# Patient Record
Sex: FEMALE | Race: WHITE | Employment: OTHER | URBAN - METROPOLITAN AREA
[De-identification: names, ages, dates, MRNs, and addresses within clinical notes are randomized per-mention and may not be internally consistent; named-entity substitution may affect disease eponyms.]

---

## 2017-01-11 ENCOUNTER — OFFICE VISIT (OUTPATIENT)
Dept: OPHTHALMOLOGY | Facility: CLINIC | Age: 82
End: 2017-01-11
Payer: COMMERCIAL

## 2017-01-11 DIAGNOSIS — H40.1492 PSEUDOEXFOLIATION GLAUCOMA, MODERATE STAGE: Primary | ICD-10-CM

## 2017-01-11 PROCEDURE — 99213 OFFICE O/P EST LOW 20 MIN: CPT | Performed by: OPHTHALMOLOGY

## 2017-01-11 ASSESSMENT — TONOMETRY
OD_IOP_MMHG: 13
OS_IOP_MMHG: 13
IOP_METHOD: APPLANATION

## 2017-01-11 ASSESSMENT — VISUAL ACUITY
METHOD: SNELLEN - LINEAR
OS_SC: 20/30
OD_SC: 20/30

## 2017-01-11 ASSESSMENT — SLIT LAMP EXAM - LIDS: COMMENTS: 1+ DERMATOCHALASIS - UPPER LID

## 2017-01-11 ASSESSMENT — EXTERNAL EXAM - LEFT EYE: OS_EXAM: NORMAL

## 2017-01-11 ASSESSMENT — EXTERNAL EXAM - RIGHT EYE: OD_EXAM: NORMAL

## 2017-01-11 NOTE — PROGRESS NOTES
Current Eye Medications:  Xalatan every evening both eyes 9:30 pm, Trusopt Bid both eyes 10 am     Subjective:  3 wk intraocular pressure recheck with post slt left eye times 3 wks.  Patient reports she continues to see good and her eyes feel otherwise fine to her.     Objective:  See Ophthalmology Exam.       Assessment:  Intraocular pressure still +/- following recent Selective Laser Trabeculoplasty left eye.      Plan: Continue Xalatan every evening both eyes and Trusopt twice daily both eyes.  Return visit 4 months for a complete exam     Clay Amos M.D.

## 2017-01-11 NOTE — PATIENT INSTRUCTIONS
Continue Xalatan every evening both eyes and Trusopt twice daily both eyes.  Return visit 4 months for a complete exam     Clay Amos M.D.

## 2017-01-11 NOTE — MR AVS SNAPSHOT
"              After Visit Summary   2017    Jeb Palomino    MRN: [id number]           Patient Information     Date Of Birth          1935        Visit Information        Provider Department      2017 2:15 PM Clay Amos MD [facility]        Today's Diagnoses     Pseudoexfoliation glaucoma, moderate stage    -  1       Care Instructions    Continue Xalatan every evening both eyes and Trusopt twice daily both eyes.  Return visit 4 months for a complete exam     Clay Amos M.D.          Follow-ups after your visit        Who to contact     If you have questions or need follow up information about today's clinic visit or your schedule please contact [facility] directly at [phone].  Normal or non-critical lab and imaging results will be communicated to you by MiNOWirelesshart, letter or phone within 4 business days after the clinic has received the results. If you do not hear from us within 7 days, please contact the clinic through MiNOWirelesshart or phone. If you have a critical or abnormal lab result, we will notify you by phone as soon as possible.  Submit refill requests through wireLawyer or call your pharmacy and they will forward the refill request to us. Please allow 3 business days for your refill to be completed.          Additional Information About Your Visit        MyChart Information     wireLawyer lets you send messages to your doctor, view your test results, renew your prescriptions, schedule appointments and more. To sign up, go to [email or web address] . Click on \"Log in\" on the left side of the screen, which will take you to the Welcome page. Then click on \"Sign up Now\" on the right side of the page.     You will be asked to enter the access code listed below, as well as some personal information. Please follow the directions to create your username and password.     Your access code is: [email or web address]  Expires: 3/20/2017  2:30 PM     Your access code will  " in 90 days. If you need help or a new code, please call your [facility] clinic or [phone].        Care EveryWhere ID     This is your Care EveryWhere ID. This could be used by other organizations to access your [facility] medical records  [id number]         Blood Pressure from Last 3 Encounters:   07/18/16 81/57   05/23/16 133/61   06/22/15 125/63    Weight from Last 3 Encounters:   07/18/16 53.978 kg (119 lb)   06/22/15 62.143 kg (137 lb)   04/27/15 65.137 kg (143 lb 9.6 oz)              Today, you had the following     No orders found for display       Primary Care Provider Office Phone # Fax #    Jose Doyle [phone]       [address] [facility] [address]        Thank you!     Thank you for choosing [facility]  for your care. Our goal is always to provide you with excellent care. Hearing back from our patients is one way we can continue to improve our services. Please take a few minutes to complete the written survey that you may receive in the mail after your visit with us. Thank you!             Your Updated Medication List - Protect others around you: Learn how to safely use, store and throw away your medicines at www.disposemymeds.org.          This list is accurate as of: 1/11/17  3:34 PM.  Always use your most recent med list.                   Brand Name Dispense Instructions for use    * albuterol (2.5 MG/3ML) 0.083% neb solution     1 Box    Take 1 vial (2.5 mg) by nebulization every 4 hours as needed for shortness of breath / dyspnea       * albuterol 108 (90 BASE) MCG/ACT Inhaler    PROAIR HFA/PROVENTIL HFA/VENTOLIN HFA    1 Inhaler    Inhale 2 puffs into the lungs every 6 hours as needed for shortness of breath / dyspnea       aspirin 81 MG EC tablet     40 tablet    Take 1 tablet (81 mg) by mouth daily       atenolol 25 MG tablet    TENORMIN    90 tablet    Take 2 tablets (50 mg) by mouth daily        budesonide-formoterol 160-4.5 MCG/ACT Inhaler    SYMBICORT    3 Inhaler    Inhale 2 puffs into the lungs 2 times daily       dorzolamide 2 % ophthalmic solution    TRUSOPT    3 Bottle    Place 1 drop into both eyes 2 times daily       latanoprost 0.005 % ophthalmic solution    XALATAN    7.5 mL    Place 1 drop into both eyes At Bedtime       MULTI VIT/FL PO      1 TABLET DAILY       order for DME     1 each    Equipment being ordered: Oxygen.  Oxygen to be titrated per RT to keep SaO2 > 90%. Enroll patient in COPD program for continued follow up. 12/31/12 SaO2 90% room air rest, 76% room air with activity, 88% 2lpm continuous flow with activity.       order for DME     1 Device    Equipment being ordered: Vision Air machine and all associated supplies  Patient is on home oxygen       simvastatin 20 MG tablet    ZOCOR    90 tablet    Take 1 tablet (20 mg) by mouth At Bedtime Overdue for office visit and labs       tiotropium 18 MCG capsule    SPIRIVA HANDIHALER    90 capsule    Inhale 1 capsule (18 mcg) into the lungs daily       vitamin D 2000 UNITS Caps      Take  by mouth daily.       * Notice:  This list has 2 medication(s) that are the same as other medications prescribed for you. Read the directions carefully, and ask your doctor or other care provider to review them with you.

## 2017-01-16 DIAGNOSIS — I10 ESSENTIAL HYPERTENSION WITH GOAL BLOOD PRESSURE LESS THAN 140/90: Primary | ICD-10-CM

## 2017-01-16 RX ORDER — ATENOLOL 25 MG/1
50 TABLET ORAL DAILY
Qty: 180 TABLET | Refills: 1 | Status: SHIPPED | OUTPATIENT
Start: 2017-01-16 | End: 2017-07-05

## 2017-01-16 NOTE — TELEPHONE ENCOUNTER
Patient last seen by primary care 7/18/16.    The dispensed quantity ends up only being 45 days with refills.  I adjusted the dispense and routed to Dr. Doyle.  Abnormal BP so RN unable to fill.  Janel Lainez RN  [facility]

## 2017-01-16 NOTE — TELEPHONE ENCOUNTER
atenolol (TENORMIN) 25 MG tablet      Last Written Prescription Date: 7/18/16  Last Fill Quantity: 90, # refills: 3    Last Office Visit with [facility], [facility] or [facility] prescribing provider:  1/11/17   Future Office Visit:        BP Readings from Last 3 Encounters:   07/18/16 81/57   05/23/16 133/61   06/22/15 125/63

## 2017-05-17 ENCOUNTER — OFFICE VISIT (OUTPATIENT)
Dept: OPHTHALMOLOGY | Facility: CLINIC | Age: 82
End: 2017-05-17
Payer: COMMERCIAL

## 2017-05-17 DIAGNOSIS — H52.4 PRESBYOPIA: ICD-10-CM

## 2017-05-17 DIAGNOSIS — Z01.01 ENCOUNTER FOR EXAMINATION OF EYES AND VISION WITH ABNORMAL FINDINGS: Primary | ICD-10-CM

## 2017-05-17 DIAGNOSIS — H40.1492 PSEUDOEXFOLIATION GLAUCOMA, MODERATE STAGE: ICD-10-CM

## 2017-05-17 DIAGNOSIS — Z96.1 PSEUDOPHAKIA: ICD-10-CM

## 2017-05-17 DIAGNOSIS — H34.231 BRANCH RETINAL ARTERY OCCLUSION, RIGHT: ICD-10-CM

## 2017-05-17 PROCEDURE — 92015 DETERMINE REFRACTIVE STATE: CPT | Performed by: OPHTHALMOLOGY

## 2017-05-17 PROCEDURE — 92014 COMPRE OPH EXAM EST PT 1/>: CPT | Performed by: OPHTHALMOLOGY

## 2017-05-17 ASSESSMENT — REFRACTION_MANIFEST
OD_AXIS: 155
OS_CYLINDER: +1.25
OS_ADD: +3.00
OS_AXIS: 180
OS_SPHERE: -1.00
OD_ADD: +3.00
OD_SPHERE: -0.50
OD_CYLINDER: +0.50

## 2017-05-17 ASSESSMENT — REFRACTION_WEARINGRX
SPECS_TYPE: OTC
OS_SPHERE: +1.75
OD_SPHERE: +1.75

## 2017-05-17 ASSESSMENT — VISUAL ACUITY
OD_CC: J1+
OS_SC+: -1
OS_SC: 20/30
OS_CC: J1
CORRECTION_TYPE: GLASSES
METHOD: SNELLEN - LINEAR
OD_SC: 20/25

## 2017-05-17 ASSESSMENT — CUP TO DISC RATIO
OD_RATIO: 0.8
OS_RATIO: 0.8

## 2017-05-17 ASSESSMENT — SLIT LAMP EXAM - LIDS: COMMENTS: 1+ DERMATOCHALASIS - UPPER LID

## 2017-05-17 ASSESSMENT — EXTERNAL EXAM - LEFT EYE: OS_EXAM: NORMAL

## 2017-05-17 ASSESSMENT — EXTERNAL EXAM - RIGHT EYE: OD_EXAM: NORMAL

## 2017-05-17 ASSESSMENT — CONF VISUAL FIELD
OS_NORMAL: 1
OD_SUPERIOR_NASAL_RESTRICTION: 3
OD_SUPERIOR_TEMPORAL_RESTRICTION: 1

## 2017-05-17 ASSESSMENT — TONOMETRY
OS_IOP_MMHG: 11
IOP_METHOD: APPLANATION
OD_IOP_MMHG: 13

## 2017-05-17 NOTE — PATIENT INSTRUCTIONS
Continue same medication  Possible posterior vitreous detachment (sudden onset large floater and/or flashing lights) discussed. Both eyes   Possible clouding of posterior capsule discussed.   Continue over the counter reader  Return visit 6 months for intraocular pressure check, Andres Visual Field and glaucoma OCT     Clay Amos M.D.

## 2017-05-17 NOTE — MR AVS SNAPSHOT
After Visit Summary   5/17/2017    Jeb Palomino    MRN: [id number]           Patient Information     Date Of Birth          1935        Visit Information        Provider Department      5/17/2017 1:30 PM Clya Amos MD [facility]        Today's Diagnoses     Encounter for examination of eyes and vision with abnormal findings    -  1    Presbyopia        Myopia, bilateral        Astigmatism, bilateral        Pseudoexfoliation glaucoma, moderate stage        Pseudophakia, ou        Hx of hemiretinal artery occlusion, inferior od          Care Instructions    Continue same medication  Possible posterior vitreous detachment (sudden onset large floater and/or flashing lights) discussed. Both eyes   Possible clouding of posterior capsule discussed.   Continue over the counter reader  Return visit 6 months for intraocular pressure check, Andres Visual Field and glaucoma OCT     Clay Amos M.D.          Follow-ups after your visit        Who to contact     If you have questions or need follow up information about today's clinic visit or your schedule please contact [facility] directly at [phone].  Normal or non-critical lab and imaging results will be communicated to you by Wynlinkhart, letter or phone within 4 business days after the clinic has received the results. If you do not hear from us within 7 days, please contact the clinic through MasCupont or phone. If you have a critical or abnormal lab result, we will notify you by phone as soon as possible.  Submit refill requests through [employer] or call your pharmacy and they will forward the refill request to us. Please allow 3 business days for your refill to be completed.          Additional Information About Your Visit        WynlinkharReality Jockey Information     [employer] lets you send messages to your doctor, view your test results, renew your prescriptions, schedule appointments and more. To sign up, go to  [email or web address] . Click on \"Log in\" on the left side of the screen, which will take you to the Welcome page. Then click on \"Sign up Now\" on the right side of the page.     You will be asked to enter the access code listed below, as well as some personal information. Please follow the directions to create your username and password.     Your access code is: [email or web address]  Expires: 8/15/2017  2:26 PM     Your access code will  in 90 days. If you need help or a new code, please call your [facility] clinic or [phone].        Care EveryWhere ID     This is your Care EveryWhere ID. This could be used by other organizations to access your [facility] medical records  [id number]         Blood Pressure from Last 3 Encounters:   16 (!) 81/57   16 133/61   06/22/15 125/63    Weight from Last 3 Encounters:   16 54 kg (119 lb)   06/22/15 62.1 kg (137 lb)   04/27/15 65.1 kg (143 lb 9.6 oz)              We Performed the Following     EYE EXAM (SIMPLE-NONBILLABLE)     REFRACTIVE STATUS        Primary Care Provider Office Phone # Fax #    Jose Doyle [phone]       [address] [address]        Thank you!     Thank you for choosing [facility]  for your care. Our goal is always to provide you with excellent care. Hearing back from our patients is one way we can continue to improve our services. Please take a few minutes to complete the written survey that you may receive in the mail after your visit with us. Thank you!             Your Updated Medication List - Protect others around you: Learn how to safely use, store and throw away your medicines at www.disposemymeds.org.          This list is accurate as of: 17  2:26 PM.  Always use your most recent med list.                   Brand Name Dispense Instructions for use    * albuterol (2.5 MG/3ML) 0.083% neb solution     1 Box    Take 1 vial (2.5 mg) by " nebulization every 4 hours as needed for shortness of breath / dyspnea       * albuterol 108 (90 BASE) MCG/ACT Inhaler    PROAIR HFA/PROVENTIL HFA/VENTOLIN HFA    1 Inhaler    Inhale 2 puffs into the lungs every 6 hours as needed for shortness of breath / dyspnea       aspirin 81 MG EC tablet     40 tablet    Take 1 tablet (81 mg) by mouth daily       atenolol 25 MG tablet    TENORMIN    180 tablet    Take 2 tablets (50 mg) by mouth daily       budesonide-formoterol 160-4.5 MCG/ACT Inhaler    SYMBICORT    3 Inhaler    Inhale 2 puffs into the lungs 2 times daily       dorzolamide 2 % ophthalmic solution    TRUSOPT    3 Bottle    Place 1 drop into both eyes 2 times daily       latanoprost 0.005 % ophthalmic solution    XALATAN    7.5 mL    Place 1 drop into both eyes At Bedtime       MULTI VIT/FL PO      1 TABLET DAILY       order for DME     1 each    Equipment being ordered: Oxygen.  Oxygen to be titrated per RT to keep SaO2 > 90%. Enroll patient in COPD program for continued follow up. 12/31/12 SaO2 90% room air rest, 76% room air with activity, 88% 2lpm continuous flow with activity.       order for DME     1 Device    Equipment being ordered: Vision Air machine and all associated supplies  Patient is on home oxygen       simvastatin 20 MG tablet    ZOCOR    90 tablet    Take 1 tablet (20 mg) by mouth At Bedtime Overdue for office visit and labs       tiotropium 18 MCG capsule    SPIRIVA HANDIHALER    90 capsule    Inhale 1 capsule (18 mcg) into the lungs daily       vitamin D 2000 UNITS Caps      Take  by mouth daily.       * Notice:  This list has 2 medication(s) that are the same as other medications prescribed for you. Read the directions carefully, and ask your doctor or other care provider to review them with you.

## 2017-05-17 NOTE — PROGRESS NOTES
Current Eye Medications:  Latanoprost every evening both eyes (9pm ) and Dorzolamide twice daily both eyes      Subjective:  Complete eye exam   No changes noticed by patient since last visit here.     Objective:  See Ophthalmology Exam.       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      ICD-10-CM    1. Encounter for examination of eyes and vision with abnormal findings Z01.01 REFRACTIVE STATUS   2. Presbyopia H52.4 REFRACTIVE STATUS   3. 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 Pseudophakia, ou Z96.1    5. Hx of hemiretinal artery occlusion, inferior od H34.231         Plan: Continue same medication  Possible posterior vitreous detachment (sudden onset large floater and/or flashing lights) discussed. Both eyes   Possible clouding of posterior capsule discussed.   Continue over the counter reader  Return visit 6 months for intraocular pressure check, Andres Visual Field and glaucoma OCT     Clay Amos M.D.

## 2017-06-12 DIAGNOSIS — E78.5 HYPERLIPIDEMIA LDL GOAL <130: ICD-10-CM

## 2017-06-12 RX ORDER — SIMVASTATIN 20 MG
20 TABLET ORAL AT BEDTIME
Qty: 30 TABLET | Refills: 0 | Status: SHIPPED | OUTPATIENT
Start: 2017-06-12 | End: 2017-07-05

## 2017-06-12 NOTE — TELEPHONE ENCOUNTER
simvastatin       Last Written Prescription Date: 4/8/16  Last Fill Quantity: 90, # refills: 4    [facility] or [facility] prescribing provider:  7/18/16   Future Office Visit:      Cholesterol   Date Value Ref Range Status   07/18/2016 121 <200 mg/dL Final     HDL Cholesterol   Date Value Ref Range Status   07/18/2016 54 >49 mg/dL Final     LDL Cholesterol Calculated   Date Value Ref Range Status   07/18/2016 48 <100 mg/dL Final     Comment:     Desirable:       <100 mg/dl     Triglycerides   Date Value Ref Range Status   07/18/2016 97 <150 mg/dL Final     Comment:     Fasting specimen     Cholesterol/HDL Ratio   Date Value Ref Range Status   02/12/2015 2.3 0.0 - 5.0 Final     ALT   Date Value Ref Range Status   07/18/2016 23 0 - 50 U/L Final      Medication is being filled for 1 time refill only due to:  Patient needs to be seen because due for office visit and labs in about a month.     Janel Lainez RN  [facility]

## 2017-07-05 DIAGNOSIS — I10 ESSENTIAL HYPERTENSION WITH GOAL BLOOD PRESSURE LESS THAN 140/90: ICD-10-CM

## 2017-07-05 DIAGNOSIS — E78.5 HYPERLIPIDEMIA LDL GOAL <130: ICD-10-CM

## 2017-07-05 DIAGNOSIS — H40.1492 PSEUDOEXFOLIATION GLAUCOMA, MODERATE STAGE: Primary | ICD-10-CM

## 2017-07-05 RX ORDER — LATANOPROST 50 UG/ML
1 SOLUTION/ DROPS OPHTHALMIC AT BEDTIME
Qty: 7.5 ML | Refills: 4 | Status: SHIPPED | OUTPATIENT
Start: 2017-07-05 | End: 2018-09-04

## 2017-07-05 RX ORDER — DORZOLAMIDE HCL 20 MG/ML
1 SOLUTION/ DROPS OPHTHALMIC 2 TIMES DAILY
Qty: 3 BOTTLE | Refills: 4 | Status: SHIPPED | OUTPATIENT
Start: 2017-07-05 | End: 2018-09-04

## 2017-07-05 NOTE — TELEPHONE ENCOUNTER
simvastatin (ZOCOR) 20 MG tablet     Last Written Prescription Date: 6-12-17  Last Fill Quantity: 30, # refills: 0  Last Office Visit with [facility] or [facility] prescribing provider: 7-18-16       Lab Results   Component Value Date    CHOL 121 07/18/2016     Lab Results   Component Value Date    HDL 54 07/18/2016     Lab Results   Component Value Date    LDL 48 07/18/2016     Lab Results   Component Value Date    TRIG 97 07/18/2016     Lab Results   Component Value Date    CHOLHDLRATIO 2.3 02/12/2015     atenolol (TENORMIN) 25 MG tablet      Last Written Prescription Date: 1-16-17  Last Fill Quantity: 180, # refills: 1    Last Office Visit with [facility] or [facility] prescribing provider:  7-18-16   Future Office Visit:        BP Readings from Last 3 Encounters:   07/18/16 (!) 81/57   05/23/16 133/61   06/22/15 125/63

## 2017-07-07 RX ORDER — ATENOLOL 25 MG/1
TABLET ORAL
Qty: 180 TABLET | Refills: 0 | Status: SHIPPED | OUTPATIENT
Start: 2017-07-07 | End: 2017-09-12

## 2017-07-07 RX ORDER — SIMVASTATIN 20 MG
TABLET ORAL
Qty: 90 TABLET | Refills: 0 | Status: SHIPPED | OUTPATIENT
Start: 2017-07-07 | End: 2017-09-12

## 2017-07-07 NOTE — TELEPHONE ENCOUNTER
Medication is being filled for 1 time refill only due to:  Patient needs labs per below. Patient needs to be seen because it has been more than one year since last visit.    Patient has appointment with PCP 7-10-17.

## 2017-07-15 ENCOUNTER — TELEPHONE (OUTPATIENT)
Dept: FAMILY MEDICINE | Facility: CLINIC | Age: 82
End: 2017-07-15

## 2017-07-15 DIAGNOSIS — E78.5 HYPERLIPIDEMIA LDL GOAL <130: ICD-10-CM

## 2017-07-17 RX ORDER — SIMVASTATIN 20 MG
TABLET ORAL
Qty: 30 TABLET | Refills: 0 | OUTPATIENT
Start: 2017-07-17

## 2017-07-17 NOTE — TELEPHONE ENCOUNTER
Message left on home number for patient to call back TC line.  NTBS for fasting labs and (physical) with PCP.    Jacqui GASTELUM

## 2017-07-17 NOTE — TELEPHONE ENCOUNTER
simvastatin (ZOCOR) 20 MG tablet     Last Written Prescription Date: 7/7/17  Last Fill Quantity: 90, # refills: 0  Last Office Visit with [facility], [facility] or [facility] prescribing provider: 7/18/16       Lab Results   Component Value Date    CHOL 121 07/18/2016     Lab Results   Component Value Date    HDL 54 07/18/2016     Lab Results   Component Value Date    LDL 48 07/18/2016     Lab Results   Component Value Date    TRIG 97 07/18/2016     Lab Results   Component Value Date    CHOLHDLRADRIENNEO 2.3 02/12/2015

## 2017-07-17 NOTE — TELEPHONE ENCOUNTER
3 month supply sent 7/7/17.    Patient needs to be seen as she is due for physical/labs.  Routed to team to assist with setting up OV.      Judy Lagos RN  [facility]

## 2017-07-18 NOTE — TELEPHONE ENCOUNTER
Patient returned call. Message below communicated to patient. Patient will be back in town the beginning of August and will call to schedule.

## 2017-08-04 ENCOUNTER — TELEPHONE (OUTPATIENT)
Dept: FAMILY MEDICINE | Facility: CLINIC | Age: 82
End: 2017-08-04

## 2017-08-04 DIAGNOSIS — J44.9 CHRONIC OBSTRUCTIVE PULMONARY DISEASE, UNSPECIFIED COPD TYPE (H): ICD-10-CM

## 2017-08-07 RX ORDER — BUDESONIDE AND FORMOTEROL FUMARATE DIHYDRATE 160; 4.5 UG/1; UG/1
AEROSOL RESPIRATORY (INHALATION)
Qty: 1 INHALER | Refills: 0 | Status: SHIPPED | OUTPATIENT
Start: 2017-08-07 | End: 2017-09-12

## 2017-08-07 NOTE — TELEPHONE ENCOUNTER
SYMBICORT 160-4.5 MCG/ACT Inhaler       Last Written Prescription Date: 8-3-17  Last Fill Quantity: 1, # refills: 0    Last Office Visit with [facility], [facility] or [facility] prescribing provider:  7-18-16   Future Office Visit:       Date of Last Asthma Action Plan Letter:   There are no preventive care reminders to display for this patient.   Asthma Control Test: No flowsheet data found.    Date of Last Spirometry Test:   No results found for this or any previous visit.

## 2017-08-07 NOTE — TELEPHONE ENCOUNTER
Routing refill request to provider for review/approval because:  Patient needs to be seen because it has been more than 1 year since last office visit.  Sandi Hedrick RN [facility] Triage.

## 2017-08-29 ENCOUNTER — OFFICE VISIT (OUTPATIENT)
Dept: FAMILY MEDICINE | Facility: CLINIC | Age: 82
End: 2017-08-29
Payer: COMMERCIAL

## 2017-08-29 VITALS
OXYGEN SATURATION: 97 % | WEIGHT: 108 LBS | TEMPERATURE: 97.4 F | HEART RATE: 69 BPM | SYSTOLIC BLOOD PRESSURE: 164 MMHG | BODY MASS INDEX: 18.25 KG/M2 | DIASTOLIC BLOOD PRESSURE: 71 MMHG

## 2017-08-29 DIAGNOSIS — S81.802A NON-HEALING WOUND OF LOWER EXTREMITY, LEFT, INITIAL ENCOUNTER: Primary | ICD-10-CM

## 2017-08-29 DIAGNOSIS — L03.116 CELLULITIS OF LEFT LOWER EXTREMITY: ICD-10-CM

## 2017-08-29 LAB
ANION GAP SERPL CALCULATED.3IONS-SCNC: 9 MMOL/L (ref 3–14)
BASOPHILS # BLD AUTO: 0 10E9/L (ref 0–0.2)
BASOPHILS NFR BLD AUTO: 0.6 %
BUN SERPL-MCNC: 6 MG/DL (ref 7–30)
CALCIUM SERPL-MCNC: 9.4 MG/DL (ref 8.5–10.1)
CHLORIDE SERPL-SCNC: 90 MMOL/L (ref 94–109)
CO2 SERPL-SCNC: 31 MMOL/L (ref 20–32)
CREAT SERPL-MCNC: 0.77 MG/DL (ref 0.52–1.04)
DIFFERENTIAL METHOD BLD: ABNORMAL
EOSINOPHIL # BLD AUTO: 0.5 10E9/L (ref 0–0.7)
EOSINOPHIL NFR BLD AUTO: 7.2 %
ERYTHROCYTE [DISTWIDTH] IN BLOOD BY AUTOMATED COUNT: 11.2 % (ref 10–15)
ERYTHROCYTE [SEDIMENTATION RATE] IN BLOOD BY WESTERGREN METHOD: 9 MM/H (ref 0–30)
GFR SERPL CREATININE-BSD FRML MDRD: 72 ML/MIN/1.7M2
GLUCOSE SERPL-MCNC: 97 MG/DL (ref 70–99)
HCT VFR BLD AUTO: 37.8 % (ref 35–47)
HGB BLD-MCNC: 12.9 G/DL (ref 11.7–15.7)
LYMPHOCYTES # BLD AUTO: 1.6 10E9/L (ref 0.8–5.3)
LYMPHOCYTES NFR BLD AUTO: 22.8 %
MCH RBC QN AUTO: 33.3 PG (ref 26.5–33)
MCHC RBC AUTO-ENTMCNC: 34.1 G/DL (ref 31.5–36.5)
MCV RBC AUTO: 98 FL (ref 78–100)
MONOCYTES # BLD AUTO: 0.8 10E9/L (ref 0–1.3)
MONOCYTES NFR BLD AUTO: 10.7 %
NEUTROPHILS # BLD AUTO: 4.2 10E9/L (ref 1.6–8.3)
NEUTROPHILS NFR BLD AUTO: 58.7 %
PLATELET # BLD AUTO: 159 10E9/L (ref 150–450)
POTASSIUM SERPL-SCNC: 4.1 MMOL/L (ref 3.4–5.3)
RBC # BLD AUTO: 3.87 10E12/L (ref 3.8–5.2)
SODIUM SERPL-SCNC: 130 MMOL/L (ref 133–144)
WBC # BLD AUTO: 7.1 10E9/L (ref 4–11)

## 2017-08-29 PROCEDURE — 85652 RBC SED RATE AUTOMATED: CPT | Performed by: FAMILY MEDICINE

## 2017-08-29 PROCEDURE — 85025 COMPLETE CBC W/AUTO DIFF WBC: CPT | Performed by: FAMILY MEDICINE

## 2017-08-29 PROCEDURE — 99214 OFFICE O/P EST MOD 30 MIN: CPT | Performed by: FAMILY MEDICINE

## 2017-08-29 PROCEDURE — 36415 COLL VENOUS BLD VENIPUNCTURE: CPT | Performed by: FAMILY MEDICINE

## 2017-08-29 PROCEDURE — 80048 BASIC METABOLIC PNL TOTAL CA: CPT | Performed by: FAMILY MEDICINE

## 2017-08-29 RX ORDER — SULFAMETHOXAZOLE/TRIMETHOPRIM 800-160 MG
1 TABLET ORAL 2 TIMES DAILY
Qty: 20 TABLET | Refills: 0 | Status: SHIPPED | OUTPATIENT
Start: 2017-08-29 | End: 2017-09-12

## 2017-08-29 ASSESSMENT — PAIN SCALES - GENERAL: PAINLEVEL: NO PAIN (0)

## 2017-08-29 NOTE — NURSING NOTE
"Chief Complaint   Patient presents with     Patient Request     LLE wound       Initial /71 (BP Location: Right arm, Patient Position: Chair, Cuff Size: Adult Regular)  Pulse 69  Temp 97.4  F (36.3  C) (Oral)  Wt 108 lb (49 kg)  SpO2 97%  BMI 18.25 kg/m2 Estimated body mass index is 18.25 kg/(m^2) as calculated from the following:    Height as of 4/27/15: 5' 4.5\" (1.638 m).    Weight as of this encounter: 108 lb (49 kg).  Medication Reconciliation: complete\  Sarah Rice MA      "

## 2017-08-29 NOTE — MR AVS SNAPSHOT
"              After Visit Summary   8/29/2017    Jeb Palomino    MRN: [id number]           Patient Information     Date Of Birth          1935        Visit Information        Provider Department      8/29/2017 10:20 AM Engelmann, Lauren Anneliese, MD [facility]        Today's Diagnoses     Non-healing wound of lower extremity, left, initial encounter    -  1    Cellulitis of left lower extremity           Follow-ups after your visit        Your next 10 appointments already scheduled     Nov 07, 2017  1:45 PM CST   Return Visit with Clay Amos MD   [facility] ([facility])    [address] [place] [id number] [phone]              Who to contact     If you have questions or need follow up information about today's clinic visit or your schedule please contact [facility] directly at [phone].  Normal or non-critical lab and imaging results will be communicated to you by MyChart, letter or phone within 4 business days after the clinic has received the results. If you do not hear from us within 7 days, please contact the clinic through MyChart or phone. If you have a critical or abnormal lab result, we will notify you by phone as soon as possible.  Submit refill requests through Movli or call your pharmacy and they will forward the refill request to us. Please allow 3 business days for your refill to be completed.          Additional Information About Your Visit        MyChart Information     Movli lets you send messages to your doctor, view your test results, renew your prescriptions, schedule appointments and more. To sign up, go to [email or web address] . Click on \"Log in\" on the left side of the screen, which will take you to the Welcome page. Then click on \"Sign up Now\" on the right side of the page.     You will be asked to enter the access code listed below, as well as some personal information. Please " follow the directions to create your username and password.     Your access code is: [email or web address]  Expires: 2017 10:37 AM     Your access code will  in 90 days. If you need help or a new code, please call your [facility] clinic or [phone].        Care EveryWhere ID     This is your Care EveryWhere ID. This could be used by other organizations to access your [facility] medical records  [id number]        Your Vitals Were     Pulse Temperature Pulse Oximetry BMI (Body Mass Index)          69 97.4  F (36.3  C) (Oral) 97% 18.25 kg/m2         Blood Pressure from Last 3 Encounters:   17 164/71   16 (!) 81/57   16 133/61    Weight from Last 3 Encounters:   17 108 lb (49 kg)   16 119 lb (54 kg)   06/22/15 137 lb (62.1 kg)              We Performed the Following     Basic metabolic panel     CBC with platelets and differential     ESR: Erythrocyte sedimentation rate          Today's Medication Changes          These changes are accurate as of: 17 10:37 AM.  If you have any questions, ask your nurse or doctor.               Start taking these medicines.        Dose/Directions    sulfamethoxazole-trimethoprim 800-160 MG per tablet   Commonly known as:  BACTRIM DS/SEPTRA DS   Used for:  Cellulitis of left lower extremity   Started by:  Engelmann, Lauren Anneliese, MD        Dose:  1 tablet   Take 1 tablet by mouth 2 times daily   Quantity:  20 tablet   Refills:  0            Where to get your medicines      These medications were sent to [facility] [place] - [address]  [address] [place] [address]     Phone:  [phone]     sulfamethoxazole-trimethoprim 800-160 MG per tablet                Primary Care Provider Office Phone # Fax #    Jose Doyle [phone] [address] [facility] [address]        Equal Access to Services     NANCY POLLOCK AH: Hadii aad nilda Espinoza,  ann alcantara ah. So [facility] [phone].    ATENCIÓN: Si domingo sawant, tiene a molina disposición servicios gratuitos de asistencia lingüística. Jonh serrano [phone].    We comply with applicable federal civil rights laws and [place] laws. We do not discriminate on the basis of race, color, national origin, age, disability sex, sexual orientation or gender identity.            Thank you!     Thank you for choosing [facility]  for your care. Our goal is always to provide you with excellent care. Hearing back from our patients is one way we can continue to improve our services. Please take a few minutes to complete the written survey that you may receive in the mail after your visit with us. Thank you!             Your Updated Medication List - Protect others around you: Learn how to safely use, store and throw away your medicines at www.disposemymeds.org.          This list is accurate as of: 8/29/17 10:37 AM.  Always use your most recent med list.                   Brand Name Dispense Instructions for use Diagnosis    * albuterol (2.5 MG/3ML) 0.083% neb solution     1 Box    Take 1 vial (2.5 mg) by nebulization every 4 hours as needed for shortness of breath / dyspnea    COPD (chronic obstructive pulmonary disease) (H)       * albuterol 108 (90 BASE) MCG/ACT Inhaler    PROAIR HFA/PROVENTIL HFA/VENTOLIN HFA    1 Inhaler    Inhale 2 puffs into the lungs every 6 hours as needed for shortness of breath / dyspnea    Chronic obstructive pulmonary disease, unspecified COPD type (H)       aspirin 81 MG EC tablet     40 tablet    Take 1 tablet (81 mg) by mouth daily        * atenolol 25 MG tablet    TENORMIN    180 tablet    TAKE TWO TABLETS BY MOUTH ONCE DAILY    Essential hypertension with goal blood pressure less than 140/90       * atenolol 25 MG tablet    TENORMIN    30 tablet    TAKE TWO TABLETS BY MOUTH ONCE DAILY    Essential hypertension with  goal blood pressure less than 140/90       * budesonide-formoterol 160-4.5 MCG/ACT Inhaler    SYMBICORT    3 Inhaler    Inhale 2 puffs into the lungs 2 times daily    Chronic obstructive pulmonary disease, unspecified COPD type (H)       * SYMBICORT 160-4.5 MCG/ACT Inhaler   Generic drug:  budesonide-formoterol     1 Inhaler    INHALE TWO PUFFS BY MOUTH TWICE DAILY - NEEDS TO BE SEEN IN CLINIC FOR MORE REFILLS.    Chronic obstructive pulmonary disease, unspecified COPD type (H)       * SYMBICORT 160-4.5 MCG/ACT Inhaler   Generic drug:  budesonide-formoterol     1 Inhaler    INHALE TWO PUFFS BY MOUTH TWICE DAILY - NEEDS TO BE SEEN IN CLINIC FOR MORE REFILLS.    Chronic obstructive pulmonary disease, unspecified COPD type (H)       dorzolamide 2 % ophthalmic solution    TRUSOPT    3 Bottle    Place 1 drop into both eyes 2 times daily    Pseudoexfoliation glaucoma, moderate stage       latanoprost 0.005 % ophthalmic solution    XALATAN    7.5 mL    Place 1 drop into both eyes At Bedtime    Pseudoexfoliation glaucoma, moderate stage       MULTI VIT/FL PO      1 TABLET DAILY        order for DME     1 each    Equipment being ordered: Oxygen.  Oxygen to be titrated per RT to keep SaO2 > 90%. Enroll patient in COPD program for continued follow up. 12/31/12 SaO2 90% room air rest, 76% room air with activity, 88% 2lpm continuous flow with activity.    COPD (chronic obstructive pulmonary disease) (H)       order for DME     1 Device    Equipment being ordered: Vision Air machine and all associated supplies  Patient is on home oxygen    COPD (chronic obstructive pulmonary disease) (H)       simvastatin 20 MG tablet    ZOCOR    90 tablet    TAKE ONE TABLET BY MOUTH ONCE DAILY AT BEDTIME.  DUE FOR FASTING LABS AND OFFICE VISIT IN JULY    Hyperlipidemia LDL goal <130       sulfamethoxazole-trimethoprim 800-160 MG per tablet    BACTRIM DS/SEPTRA DS    20 tablet    Take 1 tablet by mouth 2 times daily    Cellulitis of left lower  extremity       * tiotropium 18 MCG capsule    SPIRIVA HANDIHALER    90 capsule    Inhale 1 capsule (18 mcg) into the lungs daily    Chronic obstructive pulmonary disease, unspecified COPD type (H)       * SPIRIVA HANDIHALER 18 MCG capsule   Generic drug:  tiotropium     90 capsule    INHALE ONE DOSE BY MOUTH ONCE DAILY    Chronic obstructive pulmonary disease, unspecified COPD type (H)       vitamin D 2000 UNITS Caps      Take  by mouth daily.        * Notice:  This list has 9 medication(s) that are the same as other medications prescribed for you. Read the directions carefully, and ask your doctor or other care provider to review them with you.

## 2017-08-29 NOTE — PROGRESS NOTES
SUBJECTIVE:   Jeb Palomino is a 82 year old female who presents to clinic today for the following health issues:    LLE Wound      Duration: 1 month ago    Description (location/character/radiation): Lower left leg- wire basket fell on her leg and scraped it.    Intensity:  severe    Accompanying signs and symptoms: Spreading, infected, swollen, spotty draining    History (similar episodes/previous evaluation): None    Precipitating or alleviating factors: None    Therapies tried and outcome: OTC Salve cream, Antiseptic Spray     No pain. Thinks it's spreading and getting worse.   Only consulted with a pharmacist about it, didn't otherwise seek care.   No chills, no fever. Able to ambulate. No bony pain.     Problem list and histories reviewed & adjusted, as indicated.  Additional history: as documented    Patient Active Problem List   Diagnosis     COPD (chronic obstructive pulmonary disease) (H)     Carotid stenosis     Hx ofhemiretinal artery occlusion, inferior, od     HYPERLIPIDEMIA LDL GOAL <130     Pseudophakia, ou     Hypertension goal BP (blood pressure) < 140/90     Advanced directives, counseling/discussion     Pseudoexfoliation glaucoma, moderate stage     Chronic obstructive pulmonary disease, unspecified COPD type (H)     Past Surgical History:   Procedure Laterality Date     CATARACT IOL, RT/LT       COLONOSCOPY  2009    Normal w/[facility]      LASER SELECTIVE TRABECULOPLASTY  3/2008    right eye temp 180     LASER SELECTIVE TRABECULOPLASTY  12/2016    left eye inf 180     PHACOEMULSIFICATION WITH STANDARD INTRAOCULAR LENS IMPLANT  3/2001; 4/2001    right eye; left eye       Social History   Substance Use Topics     Smoking status: Former Smoker     Types: Cigarettes     Quit date: 2/9/2009     Smokeless tobacco: Never Used     Alcohol use Yes      Comment: Glass of wine daily     Family History   Problem Relation Age of Onset     Cardiovascular Father      DIABETES Other      cousin     Asthma  8/17/2017      RE: Betty Villasenor  [address] [facility] [address]         Betty Villasenor MRN# [id number]   Age: 51 year old YOB: 1965          Chief complaint: The pt comes for a clinic follow up after a recent hospitalization at [facility] for a new diagnosis of peripheral T-cell lymphoma in the context of a prior diagnosis of CTCL       HPI:     Betty Villasenor is an 51 year old female with a long standing Hx of folliculotropic CTCL, carcinoid tumor s/p excision, anxiety and tachycardia, and a new diagnosis of peripheral T cell lymphoma.  She was recently admitted to the hospital from 8/1/17 to 8/10/17 for evaluation after weeks of new onset fever, night sweats, fatigue, malaise and bone pain in the setting of a history of follicular trophic cutaneous T-cell lymphoma. During her hospital stay, she had extensive infectious workup including blood and Urine Cx that were unremarkable, Viral Serologies: Ab to EBV and CMV+, EBV DNA Neg, HSV1 IgG pos, HSV2 neg, HIV neg, Hep C neg, Hep B indicated immunization. BMBx on 8/2 was a dry tap but the pathology was suggestive of NK/T lymphoma with TCR gamma gene positive. PET scan showed hypermetabolic 1.2 cm pulm nodule and 3cm adrenal mass and extensively metabolic bone marrow of axial skeleton and lower exretemity long bones. Adrenal mass and lt forehead skin were biopsied. Adrenal biopsy suggested mature T-cell lymphoma while skin biopsy was consistent with atypical folliculotropic lymphoid infiltrate. She was initially treated with broad spectrum Abx, Cefepime and was discharged on Levofloxacin. Celebrex and  Dexamethasone were added.     Since discharge, she is doing better. Her fever and night sweats have resolved. The skin rash on the lt forehead has slightly improved. Other rashes on the body have remained stable. However, fatigue and generalized muscle weakness have been gradually progressing. For the last few days she has been having recurrent  "dizziness when she gets up from recumbent position. She has problems in getting up from recumbent position. She also came to ER on 8/15 for dehydration and got IVF and felt little better. Her appetite is suppressed and has lost about 3 lbs since discharge. She has on and off mid back pain that wraps around the front of the abdomen. No associated rash. This is responsive to Tylenol. She spends most of the day in bed or on the recliner and needs help with most of the ADL's and IADL's. She denies any bone pain, chest pain, SOB, cough, wheezing, abd pain, N/V/D.     Review of system: A 10 point review of systems is otherwise neg.    Cancer Hx:    Pt reports a  long history progressive skin lesion that started in lt forehead and was treated like \"rosacea\"  that was resistant to treatment. Later she started noticing similar skin lesion on the breast and bilateral extremities. She underwent multiple biopsies of various lesions the results of which are mentioned below.     -12/2015-Bx left frontal scalp- brisk lichenoid tisue reaction with some epidermotropism  -4/2016- Repeat bx left frontal scalp showed atypical lymphoid infiltrated with 4:1 CD4:CD8 ratio and T cell clonality  -7/2016- Bx left temple showed atypical folliculotropic lymphocytic infiltrate with same T cell clonality  -9/2016- Bx right forearm and right breast showed superficial and deep perivascular and periappendageal lymphohistocytic infiltrate --- thought to be lupus on biopsy, but likely consistent with overall picture of folliculotropic CTCL    The last biopsy (9/16) was in an outside hospital and was reportedly TCR gene rearrangement positive.     Patient was initially cared for by Dr. Unger, and later transferred dermatologic care to Dr. Staley. She was treated with various topical creams including Tacrolimus 0.1%, triam 0.1% cream and bexarotene 1%  along with NB-UVB Rx 2-3x/week.          Past Medical History:     Past Medical History: " Maternal Grandmother      CEREBROVASCULAR DISEASE Maternal Grandmother      Breast Cancer Maternal Aunt      CANCER Maternal Aunt      ovarian late in life             Reviewed and updated as needed this visit by clinical staff     Reviewed and updated as needed this visit by Provider         ROS:  Constitutional, HEENT, cardiovascular, pulmonary, gi and gu systems are negative, except as otherwise noted.      OBJECTIVE:   /71 (BP Location: Right arm, Patient Position: Chair, Cuff Size: Adult Regular)  Pulse 69  Temp 97.4  F (36.3  C) (Oral)  Wt 108 lb (49 kg)  SpO2 97%  BMI 18.25 kg/m2  Body mass index is 18.25 kg/(m^2).  GENERAL: alert, no distress, frail and elderly  SKIN: large open shallow wound on anterior lower leg with surrounding warmth, erythema. No visible bone. Weeping thin white purulent fluid.   PSYCH: mentation appears normal, affect normal/bright    Diagnostic Test Results:  Results for orders placed or performed in visit on 08/29/17 (from the past 24 hour(s))   ESR: Erythrocyte sedimentation rate   Result Value Ref Range    Sed Rate 9 0 - 30 mm/h   CBC with platelets and differential   Result Value Ref Range    WBC 7.1 4.0 - 11.0 10e9/L    RBC Count 3.87 3.8 - 5.2 10e12/L    Hemoglobin 12.9 11.7 - 15.7 g/dL    Hematocrit 37.8 35.0 - 47.0 %    MCV 98 78 - 100 fl    MCH 33.3 (H) 26.5 - 33.0 pg    MCHC 34.1 31.5 - 36.5 g/dL    RDW 11.2 10.0 - 15.0 %    Platelet Count 159 150 - 450 10e9/L    Diff Method Automated Method     % Neutrophils 58.7 %    % Lymphocytes 22.8 %    % Monocytes 10.7 %    % Eosinophils 7.2 %    % Basophils 0.6 %    Absolute Neutrophil 4.2 1.6 - 8.3 10e9/L    Absolute Lymphocytes 1.6 0.8 - 5.3 10e9/L    Absolute Monocytes 0.8 0.0 - 1.3 10e9/L    Absolute Eosinophils 0.5 0.0 - 0.7 10e9/L    Absolute Basophils 0.0 0.0 - 0.2 10e9/L       ASSESSMENT/PLAN:         ICD-10-CM    1. Non-healing wound of lower extremity, left, initial encounter S81.802A Basic metabolic panel        Diagnosis Date     Anxiety      Cancer (H)     cutaneous T-cell lymphoma     Carcinoid tumor      Tachycardia       No reported history of transfusion, zoster, TB, diabetes or known heart disease.       Past Surgical History:     Past Surgical History:   Procedure Laterality Date     ABDOMEN SURGERY      Bowel resection     APPENDECTOMY       GI SURGERY      gastric sleeve 2015     GYN SURGERY       and hysterectomy     HERNIA REPAIR               Social History:   She lives with  and works in [employer]. She is a non-smoker. She has 2 children.           Family History:     Father: Clear cell RCC  Uncle: CLL  Cousins: CLL, and 2 cousins with Leukemia             Allergies:   . No Known Allergies          Medications:     Current Outpatient Prescriptions   Medication     LORazepam (ATIVAN) 0.5 MG tablet     [DISCONTINUED] celecoxib (CELEBREX) 100 MG capsule     [DISCONTINUED] acyclovir (ZOVIRAX) 400 MG tablet     [DISCONTINUED] dexamethasone (DECADRON) 4 MG tablet     CYANOCOBALAMIN PO     FLUOXETINE HCL PO     Acetaminophen (TYLENOL PO)     atenolol (TENORMIN) 25 MG tablet     celecoxib (CELEBREX) 100 MG capsule     fluconazole (DIFLUCAN) 200 MG tablet     acyclovir (ZOVIRAX) 400 MG tablet     dexamethasone (DECADRON) 4 MG tablet     levofloxacin (LEVAQUIN) 250 MG tablet     omeprazole (PRILOSEC) 20 MG CR capsule     oxyCODONE (ROXICODONE) 5 MG IR tablet     Pediatric Multivit-Minerals-C (FLINTSTONES COMPLETE PO)     VITAMIN D, CHOLECALCIFEROL, PO     biotin 1000 MCG TABS tablet     blood glucose monitoring (NO BRAND SPECIFIED) meter device kit     triamcinolone (KENALOG) 0.1 % cream     No current facility-administered medications for this visit.         Vital signs:  Temp: 98.1  F (36.7  C) Temp src: Oral BP: 93/56 Pulse: 85   Resp: 16 SpO2: 100 %       Weight: 84.9 kg (187 lb 1.6 oz)  Estimated body mass index is 33.14 kg/(m^2) as calculated from the following:    Height as of 8/15/17:  ESR: Erythrocyte sedimentation rate     CBC with platelets and differential   2. Cellulitis of left lower extremity L03.116 sulfamethoxazole-trimethoprim (BACTRIM DS/SEPTRA DS) 800-160 MG per tablet     Suspect polymicrobial infection given chronicity. Will empirically treat with Bactrim DS to cover for MRSA. Will check CBC, ESR, BMP.   Advised wound care visit but she does not want anyone in her home. She will consider outside referral if no improvement.   Recheck on Friday. ED precautions reviewed.     See Patient Instructions    Lauren A. Engelmann, MD  [facility]   "1.6 m (5' 3\").    Weight as of this encounter: 84.9 kg (187 lb 1.6 oz).    Physical exam:    Gen: Well built and nourished, not in any acute distress  HEENT: Mucous Membrane dry, no oral lesions, no pallor, icterus, PERRLA, EOM full, no diplopia  Neck: supple, lt 1-2 cm cervical LN, non tender, movable, non tender  Lymph Nodes: no axillary or inguinal LAD   CVS: Regular Rate Rhythm, no murmurs, rubs or gallops  Resp: CTA, no added sounds  GI: Soft, non-tender, no hepatosplenomegaly or mass, bowel sounds present.   Neuro: AAOx4. Cranial nerves grossly intact. Strength 4/5 throughout.  Normal muscle tone. Sensations grossly intact.   Extremities: no LE edema.   Skin: irregular scaly plaque on the lt forehead, Multiple irregular purple colored macules in the arms and legs         Data:   The labs and imaging were reviewed.  .  Recent Results (from the past 24 hour(s))   CBC with platelets and differential    Collection Time: 08/17/17 11:57 AM   Result Value Ref Range    WBC 2.4 (L) 4.0 - 11.0 10e9/L    RBC Count 2.81 (L) 3.8 - 5.2 10e12/L    Hemoglobin 8.7 (L) 11.7 - 15.7 g/dL    Hematocrit 25.6 (L) 35.0 - 47.0 %    MCV 91 78 - 100 fl    MCH 31.0 26.5 - 33.0 pg    MCHC 34.0 31.5 - 36.5 g/dL    RDW 14.6 10.0 - 15.0 %    Platelet Count 122 (L) 150 - 450 10e9/L    Diff Method Automated Method     % Neutrophils 65.4 %    % Lymphocytes 15.0 %    % Monocytes 17.9 %    % Eosinophils 0.0 %    % Basophils 0.0 %    % Immature Granulocytes 1.7 %    Nucleated RBCs 0 0 /100    Absolute Neutrophil 1.6 1.6 - 8.3 10e9/L    Absolute Lymphocytes 0.4 (L) 0.8 - 5.3 10e9/L    Absolute Monocytes 0.4 0.0 - 1.3 10e9/L    Absolute Eosinophils 0.0 0.0 - 0.7 10e9/L    Absolute Basophils 0.0 0.0 - 0.2 10e9/L    Abs Immature Granulocytes 0.0 0 - 0.4 10e9/L    Absolute Nucleated RBC 0.0                      Last Basic Metabolic Panel:  Lab Results   Component Value Date     08/15/2017      Lab Results   Component Value Date    POTASSIUM 4.3 " 08/15/2017     Lab Results   Component Value Date    CHLORIDE 105 08/15/2017     Lab Results   Component Value Date    NATY 8.4 08/15/2017     Lab Results   Component Value Date    CO2 25 08/15/2017     Lab Results   Component Value Date    BUN 20 08/15/2017     Lab Results   Component Value Date    CR 0.50 08/15/2017     Lab Results   Component Value Date    GLC 99 08/15/2017       Assessment and Plan    1. Folliculotropic Cutaneous T cell lymphoma:   Skin Bx: CD3, CD4, CD 30 positive, largely negative for CD8, and CD56. TCR gene pending    2. Peripheral Mature T cell lymphoma:   BMBx: Extensive fibrosis, Positive for CD7 and CD56 with dim CD45, negative for CD13, CD33, CD34 and , EBV FISH neg, TCR gene: positive  Adrenal gland Bx: CD52, CD34, TdT, CD1a, CD52, TCRbetaF1,  Negative, TCR gene: pending  Overall immunophenotype of the adrenal gland appears similar to that in the bone marrow biopsy with exceptions of lack of CD45 and expression of CD3 by IHC in the adrenal gland.    Thus far from what we know it appears that the CTCL and the peripheral lymphoma are not related based on immunophenotypic data on the skin biopsy and adrenal and BMBx.There is some evidence to suggest that the bone marrow and adrenal gland are driven by similar processes.TCR gene rerarrangement studies may provide further evidence of any relationships between the different sites when available. Nonetheless she has extensive systemic disease that will probably require chemotherapy, which will be challenging because of the extensive marrow fibrosis and pancytopenia.    Pt continues to be pancytopenic Her Hb today stable at 8.7, WBC at 2.4 with ANC 1600 and platelets to 122. All the counts seem to be improving since discharge with resolution of most symptoms.  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 Baseline ECHO on file and pt has EF of 60%.     -Will order repeat LDH, B2  microglobulin, HTLV Ab w reflex, CRP   -F/u with pathologist about TCR gene test on adrenal gland and skin biopsy specimen  -Plan to contiue the current regimen including Celebrex, Dexamethasone, Levaquin, Acyclovir, flucanozole for now.  -Will have patient return to clinic in 1 week to permit additional data to be obtained regarding biopsies and lab studies.    Pt seen and discussed with Dr. Amaya.    Eliud Wong MD  Hematology Oncology fellow  [phone]    Oncology Attending    Patient seen and examined with the Oncology Fellow, Dr. Garcia. Agree with his findings, assessment and plan. We spent over an hour with the patient, her  and family the majority of time in counseling and coordination of care.    Dustin Amaya MD  Professor of Medicine  Oncology  [facility]  Office: [phone]  Clinic Fax: [phone]      CC:  MD Mariluz Woodall MD Kayla Anderson, MD Elizabeth Blixt, MD Bruce A. Peterson, MD

## 2017-09-06 DIAGNOSIS — J44.9 CHRONIC OBSTRUCTIVE PULMONARY DISEASE, UNSPECIFIED COPD TYPE (H): ICD-10-CM

## 2017-09-07 DIAGNOSIS — J44.9 COPD (CHRONIC OBSTRUCTIVE PULMONARY DISEASE) (H): ICD-10-CM

## 2017-09-07 NOTE — TELEPHONE ENCOUNTER
albuterol (PROAIR HFA, PROVENTIL HFA, VENTOLIN HFA) 108 (90 BASE) MCG/ACT inhaler       Last Written Prescription Date: 7-18-16  Last Fill Quantity: 1, # refills: 11    Last Office Visit with [facility] prescribing provider:  8-29-17   Future Office Visit:    Next 5 appointments (look out 90 days)     Sep 12, 2017 11:00 AM CDT   SHORT with Jose Doyle MD   [facility]    [address] [place] [id number]   [phone]            Nov 07, 2017  1:45 PM CST   Return Visit with Clay Amos MD   [facility]    [address] [place] [id number] [phone]                   Date of Last Asthma Action Plan Letter:   There are no preventive care reminders to display for this patient.   Asthma Control Test: No flowsheet data found.    Date of Last Spirometry Test:   No results found for this or any previous visit.

## 2017-09-08 RX ORDER — ALBUTEROL SULFATE 90 UG/1
AEROSOL, METERED RESPIRATORY (INHALATION)
Qty: 3 INHALER | Refills: 1 | Status: SHIPPED | OUTPATIENT
Start: 2017-09-08 | End: 2017-09-12

## 2017-09-08 RX ORDER — ALBUTEROL SULFATE 90 UG/1
AEROSOL, METERED RESPIRATORY (INHALATION)
Qty: 2 INHALER | Refills: 1 | Status: SHIPPED | OUTPATIENT
Start: 2017-09-08 | End: 2017-09-12

## 2017-09-12 ENCOUNTER — OFFICE VISIT (OUTPATIENT)
Dept: FAMILY MEDICINE | Facility: CLINIC | Age: 82
End: 2017-09-12
Payer: COMMERCIAL

## 2017-09-12 VITALS
SYSTOLIC BLOOD PRESSURE: 118 MMHG | HEART RATE: 71 BPM | DIASTOLIC BLOOD PRESSURE: 65 MMHG | OXYGEN SATURATION: 97 % | BODY MASS INDEX: 17.83 KG/M2 | TEMPERATURE: 97.8 F | WEIGHT: 105.5 LBS

## 2017-09-12 DIAGNOSIS — R63.4 LOSS OF WEIGHT: ICD-10-CM

## 2017-09-12 DIAGNOSIS — E78.5 HYPERLIPIDEMIA LDL GOAL <130: ICD-10-CM

## 2017-09-12 DIAGNOSIS — S81.802A OPEN WOUND OF LOWER LIMB, LEFT, INITIAL ENCOUNTER: ICD-10-CM

## 2017-09-12 DIAGNOSIS — I10 ESSENTIAL HYPERTENSION WITH GOAL BLOOD PRESSURE LESS THAN 140/90: ICD-10-CM

## 2017-09-12 DIAGNOSIS — J44.9 CHRONIC OBSTRUCTIVE PULMONARY DISEASE, UNSPECIFIED COPD TYPE (H): Primary | ICD-10-CM

## 2017-09-12 PROCEDURE — 99214 OFFICE O/P EST MOD 30 MIN: CPT | Performed by: FAMILY MEDICINE

## 2017-09-12 RX ORDER — ATENOLOL 25 MG/1
TABLET ORAL
Qty: 180 TABLET | Refills: 3 | Status: SHIPPED | OUTPATIENT
Start: 2017-09-12 | End: 2018-10-22

## 2017-09-12 RX ORDER — TIOTROPIUM BROMIDE 18 UG/1
CAPSULE ORAL; RESPIRATORY (INHALATION)
Qty: 90 CAPSULE | Refills: 3 | Status: SHIPPED | OUTPATIENT
Start: 2017-09-12 | End: 2018-10-22

## 2017-09-12 RX ORDER — ALBUTEROL SULFATE 90 UG/1
AEROSOL, METERED RESPIRATORY (INHALATION)
Qty: 3 INHALER | Refills: 3 | Status: SHIPPED | OUTPATIENT
Start: 2017-09-12 | End: 2018-10-22

## 2017-09-12 RX ORDER — ALBUTEROL SULFATE 0.83 MG/ML
1 SOLUTION RESPIRATORY (INHALATION) EVERY 4 HOURS PRN
Qty: 1 BOX | Refills: 5 | Status: SHIPPED | OUTPATIENT
Start: 2017-09-12 | End: 2018-10-22

## 2017-09-12 RX ORDER — BUDESONIDE AND FORMOTEROL FUMARATE DIHYDRATE 160; 4.5 UG/1; UG/1
AEROSOL RESPIRATORY (INHALATION)
Qty: 1 INHALER | Refills: 11 | Status: SHIPPED | OUTPATIENT
Start: 2017-09-12 | End: 2018-10-09

## 2017-09-12 RX ORDER — SIMVASTATIN 20 MG
TABLET ORAL
Qty: 90 TABLET | Refills: 3 | Status: SHIPPED | OUTPATIENT
Start: 2017-09-12 | End: 2018-10-22

## 2017-09-12 ASSESSMENT — PAIN SCALES - GENERAL: PAINLEVEL: NO PAIN (0)

## 2017-09-12 NOTE — PROGRESS NOTES
SUBJECTIVE:   Jeb Palomino is a 82 year old female who presents to clinic today for the following health issues:       Hypertension Follow-up      Outpatient blood pressures are not being checked.    Low Salt Diet: no added salt        Amount of exercise or physical activity: walks as much as possible    Problems taking medications regularly: No    Medication side effects: none  Diet: low salt      none    Problem list and histories reviewed & adjusted, as indicated.  Additional history: as documented         Reviewed and updated as needed this visit by clinical staffTobacco  Allergies  Meds  Problems  Med Hx  Surg Hx  Fam Hx  Soc Hx        Reviewed and updated as needed this visit by Provider          wound on left leg for over a month      No chest  Pain    Breathing problems are ongoing    Has oxygen, mainly uses at night    Occasionally sees lung doctor    Not much exercise    Physical Exam   Constitutional: She is oriented to person, place, and time and well-developed, well-nourished, and in no distress. No distress.   HENT:   Head: Normocephalic and atraumatic.   Neck: Carotid bruit is not present.   Cardiovascular: Normal rate, regular rhythm, normal heart sounds and intact distal pulses.  Exam reveals no gallop and no friction rub.    No murmur heard.  Pulmonary/Chest: Effort normal. No respiratory distress.   Distant breath sounds     Musculoskeletal: She exhibits no edema.   Neurological: She is alert and oriented to person, place, and time.   Skin: She is not diaphoretic.   Psychiatric: Mood and affect normal.   patient has about 3cm triangular open wound on anterior lower leg  No surrounding cellulitis.  Some chronic scab formation on edges  No drainage  Cerumen present both ear canals      ASSESSMENT / PLAN:  (J44.9) Chronic obstructive pulmonary disease, unspecified COPD type (H)  (primary encounter diagnosis)  Comment: refill inhalers and neb solution.  Advised increased activity/ exercise  as she is able  Plan: COPD ACTION PLAN, budesonide-formoterol         (SYMBICORT) 160-4.5 MCG/ACT Inhaler, tiotropium        (SPIRIVA HANDIHALER) 18 MCG capsule, albuterol         (PROAIR HFA) 108 (90 BASE) MCG/ACT Inhaler,         albuterol (2.5 MG/3ML) 0.083% neb solution             (E78.5) Hyperlipidemia LDL goal <130  Comment: recheck labs, fasting  Plan: simvastatin (ZOCOR) 20 MG tablet, Lipid panel         reflex to direct LDL, ALT, CANCELED: Lipid         panel reflex to direct LDL, CANCELED: ALT         Of note she had other labs done recently; reviewed those    (I10) Essential hypertension with goal blood pressure less than 140/90  Comment: on recheck blood pressure was okay  Plan: atenolol (TENORMIN) 25 MG tablet             (S81.802A) Open wound of lower limb, left, initial encounter  Comment: advised using antibiotic ointment and nonstick bandage to wound 1-2 x daily   Plan: follow up if not healingup     (R63.4) Loss of weight  Comment: discussed  Plan: increase calorie intake    Patient will use over the counter drops to help ear wax.  If not improving, return to clinic         I reviewed the patient's medications, allergies, medical history, family history, and social history.    Jose Doyle MD

## 2017-09-12 NOTE — MR AVS SNAPSHOT
After Visit Summary   9/12/2017    Jeb Palomino    MRN: [id number]           Patient Information     Date Of Birth          1935        Visit Information        Provider Department      9/12/2017 11:00 AM Jose Doyle MD [facility]        Today's Diagnoses     Chronic obstructive pulmonary disease, unspecified COPD type (H)    -  1    Hyperlipidemia LDL goal <130        Essential hypertension with goal blood pressure less than 140/90          Care Instructions    Stay on same medications    Use antibiotic ointment 1-2 x daily to the leg wound, use nonstick type bandage    Use over the counter ear wax dissolving drops; if ears not better in a few weeks, return to clinic     Increase exercise    Increase food intake           Follow-ups after your visit        Your next 10 appointments already scheduled     Nov 07, 2017  1:45 PM CST   Return Visit with Clay Amos MD   [facility]    [address] [place] [address] [phone]              Who to contact     If you have questions or need follow up information about today's clinic visit or your schedule please contact [facility] directly at [phone].  Normal or non-critical lab and imaging results will be communicated to you by MyChart, letter or phone within 4 business days after the clinic has received the results. If you do not hear from us within 7 days, please contact the clinic through [employer] or phone. If you have a critical or abnormal lab result, we will notify you by phone as soon as possible.  Submit refill requests through Piazza or call your pharmacy and they will forward the refill request to us. Please allow 3 business days for your refill to be completed.          Additional Information About Your Visit        MyChart Information     Piazza lets you send messages to your doctor, view your test results, renew your  "prescriptions, schedule appointments and more. To sign up, go to [email or web address] . Click on \"Log in\" on the left side of the screen, which will take you to the Welcome page. Then click on \"Sign up Now\" on the right side of the page.     You will be asked to enter the access code listed below, as well as some personal information. Please follow the directions to create your username and password.     Your access code is: [email or web address]  Expires: 2017 10:37 AM     Your access code will  in 90 days. If you need help or a new code, please call your [facility] clinic or [phone].        Care EveryWhere ID     This is your Care EveryWhere ID. This could be used by other organizations to access your [facility] medical records  [id number]        Your Vitals Were     Pulse Temperature Pulse Oximetry Breastfeeding? BMI (Body Mass Index)       71 97.8  F (36.6  C) (Oral) 97% No 17.83 kg/m2        Blood Pressure from Last 3 Encounters:   17 118/65   17 164/71   16 (!) 81/57    Weight from Last 3 Encounters:   17 105 lb 8 oz (47.9 kg)   17 108 lb (49 kg)   16 119 lb (54 kg)              We Performed the Following     ALT     COPD ACTION PLAN     Lipid panel reflex to direct LDL          Today's Medication Changes          These changes are accurate as of: 17 12:00 PM.  If you have any questions, ask your nurse or doctor.               These medicines have changed or have updated prescriptions.        Dose/Directions    * albuterol 108 (90 BASE) MCG/ACT Inhaler   Commonly known as:  PROAIR HFA   This may have changed:  See the new instructions.   Used for:  Chronic obstructive pulmonary disease, unspecified COPD type (H)   Changed by:  Jose Doyle MD        INHALE TWO PUFFS BY MOUTH EVERY 6 HOURS AS NEEDED FOR  SHORTNESS  OF  BREATH  /  DYSPNEA   Quantity:  3 Inhaler   Refills:  3       * albuterol (2.5 MG/3ML) 0.083% neb solution   This may have changed:    - " Another medication with the same name was changed. Make sure you understand how and when to take each.  - Another medication with the same name was removed. Continue taking this medication, and follow the directions you see here.   Used for:  Chronic obstructive pulmonary disease, unspecified COPD type (H)   Changed by:  Jose Doyle MD        Dose:  1 vial   Take 1 vial (2.5 mg) by nebulization every 4 hours as needed for shortness of breath / dyspnea   Quantity:  1 Box   Refills:  5       atenolol 25 MG tablet   Commonly known as:  TENORMIN   This may have changed:  See the new instructions.   Used for:  Essential hypertension with goal blood pressure less than 140/90   Changed by:  Jose Doyle MD        TAKE TWO TABLETS BY MOUTH ONCE DAILY   Quantity:  180 tablet   Refills:  3       simvastatin 20 MG tablet   Commonly known as:  ZOCOR   This may have changed:  See the new instructions.   Used for:  Hyperlipidemia LDL goal <130   Changed by:  Jose Doyle MD        TAKE ONE TABLET BY MOUTH ONCE DAILY AT BEDTIME.   Quantity:  90 tablet   Refills:  3       * SYMBICORT 160-4.5 MCG/ACT Inhaler   This may have changed:    - Another medication with the same name was changed. Make sure you understand how and when to take each.  - Another medication with the same name was removed. Continue taking this medication, and follow the directions you see here.   Used for:  Chronic obstructive pulmonary disease, unspecified COPD type (H)   Generic drug:  budesonide-formoterol   Changed by:  Jose Doyle MD        INHALE TWO PUFFS BY MOUTH TWICE DAILY - NEEDS TO BE SEEN IN CLINIC FOR MORE REFILLS.   Quantity:  1 Inhaler   Refills:  0       * budesonide-formoterol 160-4.5 MCG/ACT Inhaler   Commonly known as:  SYMBICORT   This may have changed:  See the new instructions.   Used for:  Chronic obstructive pulmonary disease, unspecified COPD type (H)   Changed by:  Jose Doyle MD        INHALE TWO PUFFS BY  MOUTH TWICE DAILY   Quantity:  1 Inhaler   Refills:  11       tiotropium 18 MCG capsule   Commonly known as:  SPIRIVA HANDIHALER   This may have changed:  See the new instructions.   Used for:  Chronic obstructive pulmonary disease, unspecified COPD type (H)   Changed by:  Jose Doyle MD        INHALE ONE DOSE BY MOUTH ONCE DAILY   Quantity:  90 capsule   Refills:  3       * Notice:  This list has 4 medication(s) that are the same as other medications prescribed for you. Read the directions carefully, and ask your doctor or other care provider to review them with you.      Stop taking these medicines if you haven't already. Please contact your care team if you have questions.     sulfamethoxazole-trimethoprim 800-160 MG per tablet   Commonly known as:  BACTRIM DS/SEPTRA DS   Stopped by:  Jose Doyle MD                Where to get your medicines      These medications were sent to [facility] [address] [place] [address] [address] [facility] [address]  [address] [facility] [address]     Phone:  [phone]     albuterol (2.5 MG/3ML) 0.083% neb solution    albuterol 108 (90 BASE) MCG/ACT Inhaler    atenolol 25 MG tablet    budesonide-formoterol 160-4.5 MCG/ACT Inhaler    simvastatin 20 MG tablet    tiotropium 18 MCG capsule                Primary Care Provider Office Phone # Fax #    Jose Doyle [phone]       [address] [facility] [address]        Equal Access to Services     [facility] : Hadii ling abbott hadasho Soomaali, waaxda luqadaha, qaybta kaalmada adeegyada, waxay nirmal pedroza aderene manzo . So [facility] [phone].    ATENCIÓN: Si habla español, tiene a molina disposición servicios gratuitos de asistencia lingüística. Llame al [phone].    We comply with applicable federal civil rights laws and [place] laws. We do not discriminate on the basis of race, color, national origin, age, disability sex, sexual orientation or gender identity.             Thank you!     Thank you for choosing [facility]  for your care. Our goal is always to provide you with excellent care. Hearing back from our patients is one way we can continue to improve our services. Please take a few minutes to complete the written survey that you may receive in the mail after your visit with us. Thank you!             Your Updated Medication List - Protect others around you: Learn how to safely use, store and throw away your medicines at www.disposemymeds.org.          This list is accurate as of: 9/12/17 12:00 PM.  Always use your most recent med list.                   Brand Name Dispense Instructions for use Diagnosis    * albuterol 108 (90 BASE) MCG/ACT Inhaler    PROAIR HFA    3 Inhaler    INHALE TWO PUFFS BY MOUTH EVERY 6 HOURS AS NEEDED FOR  SHORTNESS  OF  BREATH  /  DYSPNEA    Chronic obstructive pulmonary disease, unspecified COPD type (H)       * albuterol (2.5 MG/3ML) 0.083% neb solution     1 Box    Take 1 vial (2.5 mg) by nebulization every 4 hours as needed for shortness of breath / dyspnea    Chronic obstructive pulmonary disease, unspecified COPD type (H)       aspirin 81 MG EC tablet     40 tablet    Take 1 tablet (81 mg) by mouth daily        atenolol 25 MG tablet    TENORMIN    180 tablet    TAKE TWO TABLETS BY MOUTH ONCE DAILY    Essential hypertension with goal blood pressure less than 140/90       dorzolamide 2 % ophthalmic solution    TRUSOPT    3 Bottle    Place 1 drop into both eyes 2 times daily    Pseudoexfoliation glaucoma, moderate stage       latanoprost 0.005 % ophthalmic solution    XALATAN    7.5 mL    Place 1 drop into both eyes At Bedtime    Pseudoexfoliation glaucoma, moderate stage       MULTI VIT/FL PO      1 TABLET DAILY        order for DME     1 each    Equipment being ordered: Oxygen.  Oxygen to be titrated per RT to keep SaO2 > 90%. Enroll patient in COPD program for continued follow up. 12/31/12 SaO2 90% room air rest, 76%  room air with activity, 88% 2lpm continuous flow with activity.    COPD (chronic obstructive pulmonary disease) (H)       order for DME     1 Device    Equipment being ordered: Vision Air machine and all associated supplies  Patient is on home oxygen    COPD (chronic obstructive pulmonary disease) (H)       simvastatin 20 MG tablet    ZOCOR    90 tablet    TAKE ONE TABLET BY MOUTH ONCE DAILY AT BEDTIME.    Hyperlipidemia LDL goal <130       * SYMBICORT 160-4.5 MCG/ACT Inhaler   Generic drug:  budesonide-formoterol     1 Inhaler    INHALE TWO PUFFS BY MOUTH TWICE DAILY - NEEDS TO BE SEEN IN CLINIC FOR MORE REFILLS.    Chronic obstructive pulmonary disease, unspecified COPD type (H)       * budesonide-formoterol 160-4.5 MCG/ACT Inhaler    SYMBICORT    1 Inhaler    INHALE TWO PUFFS BY MOUTH TWICE DAILY    Chronic obstructive pulmonary disease, unspecified COPD type (H)       tiotropium 18 MCG capsule    SPIRIVA HANDIHALER    90 capsule    INHALE ONE DOSE BY MOUTH ONCE DAILY    Chronic obstructive pulmonary disease, unspecified COPD type (H)       vitamin D 2000 UNITS Caps      Take  by mouth daily.        * Notice:  This list has 4 medication(s) that are the same as other medications prescribed for you. Read the directions carefully, and ask your doctor or other care provider to review them with you.

## 2017-09-12 NOTE — LETTER
My COPD Action Plan     Name: Jeb Palomino    YOB: 1935   Date: 9/12/2017    My doctor: Jose Doyle MD   My clinic: [address] [address] [phone]  My Controller Medicine: symbicort and spiriva   Dose:       My Rescue Medicine: Albuterol (Proair/Ventolin/Proventil) inhaler   Dose:       My Flare Up Medicine:     Dose:   FEV-1 (no units)   Date Value   11/06/2012 30     FEV1/FVC (no units)   Date Value   11/06/2012 66      My COPD Severity: Very Severe = FeV1 < 30 %      Use of Oxygen: Oxygen Not Prescribed      Make sure you've had your pneumonia   vaccines.          GREEN ZONE       Doing well today      Usual level of activity and exercise    Usual amount of cough and mucus    No shortness of breath    Usual level of health (thinking clearly, sleeping well, feel like eating) Actions:      Take daily medicines    Use oxygen as prescribed    Follow regular exercise and diet plan    Avoid cigarette smoke and other irritants that harm the lungs           YELLOW ZONE          Having a bad day or flare up      Short of breath more than usual    A lot more sputum (mucus) than usual    Sputum looks yellow, green, tan, brown or bloody    More coughing or wheezing    Fever or chills    Less energy; trouble completing activities    Trouble thinking or focusing    Using quick relief inhaler or nebulizer more often    Poor sleep; symptoms wake me up    Do not feel like eating Actions:      Get plenty of rest    Take daily medicines    Use quick relief inhaler every  4 hours    If you use oxygen, call you doctor to see if you should adjust your oxygen    Do breathing exercises or other things to help you relax    Let a loved one, friend or neighbor know you are feeling worse    Call your care team if you have 2 or more symptoms.  Start taking steroids or antibiotics if directed by your care team           RED ZONE        Need medical care now      Severe shortness of breath (feel you can't breathe)    Fever, chills    Not enough breath to do any activity    Trouble coughing up mucus, walking or talking    Blood in mucus    Frequent coughing   Rescue medicines are not working    Not able to sleep because of breathing    Feel confused or drowsy    Chest pain    Actions:      Call your health care team.  If you cannot reach your care team, call 911 or go to the emergency room.        Electronically signed by: Jose Doyle, September 12, 2017  Annual Reminders:  Meet with Care Team, Flu Shot every Fall  Pharmacy: [facility] [address] [place] [address] [address]

## 2017-09-12 NOTE — NURSING NOTE
"Chief Complaint   Patient presents with     Hypertension     Health Maintenance       Initial /63 (BP Location: Right arm, Patient Position: Chair, Cuff Size: Adult Small)  Pulse 71  Temp 97.8  F (36.6  C) (Oral)  Wt 105 lb 8 oz (47.9 kg)  SpO2 97%  Breastfeeding? No  BMI 17.83 kg/m2 Estimated body mass index is 17.83 kg/(m^2) as calculated from the following:    Height as of 4/27/15: 5' 4.5\" (1.638 m).    Weight as of this encounter: 105 lb 8 oz (47.9 kg).  Medication Reconciliation: complete   Sangita Guerrero CMA      "

## 2017-09-12 NOTE — PATIENT INSTRUCTIONS
Stay on same medications    Use antibiotic ointment 1-2 x daily to the leg wound, use nonstick type bandage    Use over the counter ear wax dissolving drops; if ears not better in a few weeks, return to clinic     Increase exercise    Increase food intake

## 2017-09-13 DIAGNOSIS — E78.5 HYPERLIPIDEMIA LDL GOAL <130: ICD-10-CM

## 2017-09-13 LAB
ALT SERPL W P-5'-P-CCNC: 26 U/L (ref 0–50)
CHOLEST SERPL-MCNC: 120 MG/DL
HDLC SERPL-MCNC: 70 MG/DL
LDLC SERPL CALC-MCNC: 24 MG/DL
NONHDLC SERPL-MCNC: 50 MG/DL
TRIGL SERPL-MCNC: 132 MG/DL

## 2017-09-13 PROCEDURE — 80061 LIPID PANEL: CPT | Performed by: FAMILY MEDICINE

## 2017-09-13 PROCEDURE — 84460 ALANINE AMINO (ALT) (SGPT): CPT | Performed by: FAMILY MEDICINE

## 2017-09-13 PROCEDURE — 36415 COLL VENOUS BLD VENIPUNCTURE: CPT | Performed by: FAMILY MEDICINE

## 2017-09-13 NOTE — LETTER
[facility]  [address] [place]  [address]  [phone]        September 15, 2017    Jeb Palomino  [address] [facility] [address]        Dear Jeb,    Your cholesterol and liver tests are fine.    Results for orders placed or performed in visit on 09/13/17   Lipid panel reflex to direct LDL   Result Value Ref Range    Cholesterol 120 <200 mg/dL    Triglycerides 132 <150 mg/dL    HDL Cholesterol 70 >49 mg/dL    LDL Cholesterol Calculated 24 <100 mg/dL    Non HDL Cholesterol 50 <130 mg/dL   ALT   Result Value Ref Range    ALT 26 0 - 50 U/L       If you have any questions please call the clinic at [phone].    Sincerely,    Jose Doyle MD  [facility]

## 2017-09-21 ENCOUNTER — OFFICE VISIT (OUTPATIENT)
Dept: FAMILY MEDICINE | Facility: CLINIC | Age: 82
End: 2017-09-21
Payer: COMMERCIAL

## 2017-09-21 VITALS
SYSTOLIC BLOOD PRESSURE: 155 MMHG | WEIGHT: 106 LBS | OXYGEN SATURATION: 95 % | HEART RATE: 72 BPM | BODY MASS INDEX: 17.91 KG/M2 | DIASTOLIC BLOOD PRESSURE: 69 MMHG | TEMPERATURE: 97.8 F

## 2017-09-21 DIAGNOSIS — S81.802D WOUND, OPEN, LEG, LEFT, SUBSEQUENT ENCOUNTER: Primary | ICD-10-CM

## 2017-09-21 DIAGNOSIS — E78.5 HYPERLIPIDEMIA LDL GOAL <130: ICD-10-CM

## 2017-09-21 DIAGNOSIS — I10 HYPERTENSION GOAL BP (BLOOD PRESSURE) < 140/90: ICD-10-CM

## 2017-09-21 PROCEDURE — 99213 OFFICE O/P EST LOW 20 MIN: CPT | Performed by: FAMILY MEDICINE

## 2017-09-21 ASSESSMENT — PAIN SCALES - GENERAL: PAINLEVEL: NO PAIN (0)

## 2017-09-21 NOTE — PROGRESS NOTES
SUBJECTIVE:   Jeb Palomino is a 82 year old female who presents to clinic today for the following health issues:       Recheck on leg wound    none    Problem list and histories reviewed & adjusted, as indicated.  Additional history: as documented         Reviewed and updated as needed this visit by clinical staff  Tobacco  Allergies  Meds  Problems  Med Hx  Surg Hx  Fam Hx  Soc Hx        Reviewed and updated as needed this visit by Provider          per patient about the same ( wound ) but around edges better       Full physical not done     Mentation and affect are fine    No tremor of speech or extremity    On left anterior leg the wound is about the same size but does look better.  Removed the bandage.  The edges of the triangular wound are not as hard and crusty as they were.  No drainage.   Not really swollen.  Not tender    ASSESSMENT / PLAN:  (S81.802D) Wound, open, leg, left, subsequent encounter  (primary encounter diagnosis)  Comment: antibiotic ointment and bandage applied. Supplies given to patient   Plan: continue this 1-2 x daily.  Anticipate slow improvement.  May take 1-2 months to heal up.  Return to clinic with problems/ questions/ symptoms.     (I10) Hypertension goal BP (blood pressure) < 140/90  Comment: difficult to get accurate blood pressure.   Plan: continue same meds.  Overall patient feeling okay     (E78.5) Hyperlipidemia LDL goal <130  Comment: labs were good recently   Plan: stay on same med       I reviewed the patient's medications, allergies, medical history, family history, and social history.    Jose Doyle MD

## 2017-09-21 NOTE — NURSING NOTE
"Chief Complaint   Patient presents with     Wound Check     Health Maintenance       Initial /69 (BP Location: Left arm, Patient Position: Chair, Cuff Size: Adult Small)  Pulse 72  Temp 97.8  F (36.6  C) (Oral)  Wt 106 lb (48.1 kg)  SpO2 95%  Breastfeeding? No  BMI 17.91 kg/m2 Estimated body mass index is 17.91 kg/(m^2) as calculated from the following:    Height as of 4/27/15: 5' 4.5\" (1.638 m).    Weight as of this encounter: 106 lb (48.1 kg).  Medication Reconciliation: complete   Sangita Guerrero CMA      "

## 2017-09-21 NOTE — PATIENT INSTRUCTIONS
Continue antibiotic ointment and daily dressing changes.  Wound should very gradually heal up.      Return to clinic if it worsens or other symptoms develop.    Continue other meds as is.

## 2017-09-21 NOTE — MR AVS SNAPSHOT
"              After Visit Summary   9/21/2017    Jeb Palomino    MRN: [id number]           Patient Information     Date Of Birth          1935        Visit Information        Provider Department      9/21/2017 11:20 AM Jose Doyle MD [facility]        Care Instructions    Continue antibiotic ointment and daily dressing changes.  Wound should very gradually heal up.      Return to clinic if it worsens or other symptoms develop.    Continue other meds as is.              Follow-ups after your visit        Your next 10 appointments already scheduled     Nov 07, 2017  1:45 PM CST   Return Visit with Clay Amos MD   [facility] ([facility]    [address] [facility] [address] [phone]              Who to contact     If you have questions or need follow up information about today's clinic visit or your schedule please contact [facility] directly at [phone].  Normal or non-critical lab and imaging results will be communicated to you by MyChart, letter or phone within 4 business days after the clinic has received the results. If you do not hear from us within 7 days, please contact the clinic through EventHivehart or phone. If you have a critical or abnormal lab result, we will notify you by phone as soon as possible.  Submit refill requests through OnAir Player or call your pharmacy and they will forward the refill request to us. Please allow 3 business days for your refill to be completed.          Additional Information About Your Visit        EventHiveharPaymate Information     OnAir Player lets you send messages to your doctor, view your test results, renew your prescriptions, schedule appointments and more. To sign up, go to [email or web address] . Click on \"Log in\" on the left side of the screen, which will take you to the Welcome page. Then click on \"Sign up Now\" on the right side of the page.     You will be asked to enter the " access code listed below, as well as some personal information. Please follow the directions to create your username and password.     Your access code is: [email or web address]  Expires: 2017 10:37 AM     Your access code will  in 90 days. If you need help or a new code, please call your [place] clinic or [phone].        Care EveryWhere ID     This is your Care EveryWhere ID. This could be used by other organizations to access your [place] medical records  [id number]        Your Vitals Were     Pulse Temperature Pulse Oximetry Breastfeeding? BMI (Body Mass Index)       72 97.8  F (36.6  C) (Oral) 95% No 17.91 kg/m2        Blood Pressure from Last 3 Encounters:   17 155/69   17 118/65   17 164/71    Weight from Last 3 Encounters:   17 106 lb (48.1 kg)   17 105 lb 8 oz (47.9 kg)   17 108 lb (49 kg)              Today, you had the following     No orders found for display       Primary Care Provider Office Phone # Fax #    Jose Doyle [phone]       [address] [facility] [address]        Equal Access to Services     NANCY POLLOCK : Hadii ling ku hadasho Soomaali, waaxda luqadaha, qaybta kaalmada adeegyada, ann kinney hayreidn jesu beatty latanya best. So [facility] [phone].    ATENCIÓN: Si habla español, tiene a molina disposición servicios gratuitos de asistencia lingüística. Llame al [phone].    We comply with applicable federal civil rights laws and [place] laws. We do not discriminate on the basis of race, color, national origin, age, disability sex, sexual orientation or gender identity.            Thank you!     Thank you for choosing [facility]  for your care. Our goal is always to provide you with excellent care. Hearing back from our patients is one way we can continue to improve our services. Please take a few minutes to complete the written survey that you may receive in the mail after your visit with  us. Thank you!             Your Updated Medication List - Protect others around you: Learn how to safely use, store and throw away your medicines at www.disposemymeds.org.          This list is accurate as of: 9/21/17 11:40 AM.  Always use your most recent med list.                   Brand Name Dispense Instructions for use Diagnosis    * albuterol 108 (90 BASE) MCG/ACT Inhaler    PROAIR HFA    3 Inhaler    INHALE TWO PUFFS BY MOUTH EVERY 6 HOURS AS NEEDED FOR  SHORTNESS  OF  BREATH  /  DYSPNEA    Chronic obstructive pulmonary disease, unspecified COPD type (H)       * albuterol (2.5 MG/3ML) 0.083% neb solution     1 Box    Take 1 vial (2.5 mg) by nebulization every 4 hours as needed for shortness of breath / dyspnea    Chronic obstructive pulmonary disease, unspecified COPD type (H)       aspirin 81 MG EC tablet     40 tablet    Take 1 tablet (81 mg) by mouth daily        atenolol 25 MG tablet    TENORMIN    180 tablet    TAKE TWO TABLETS BY MOUTH ONCE DAILY    Essential hypertension with goal blood pressure less than 140/90       dorzolamide 2 % ophthalmic solution    TRUSOPT    3 Bottle    Place 1 drop into both eyes 2 times daily    Pseudoexfoliation glaucoma, moderate stage       latanoprost 0.005 % ophthalmic solution    XALATAN    7.5 mL    Place 1 drop into both eyes At Bedtime    Pseudoexfoliation glaucoma, moderate stage       MULTI VIT/FL PO      1 TABLET DAILY        order for DME     1 each    Equipment being ordered: Oxygen.  Oxygen to be titrated per RT to keep SaO2 > 90%. Enroll patient in COPD program for continued follow up. 12/31/12 SaO2 90% room air rest, 76% room air with activity, 88% 2lpm continuous flow with activity.    COPD (chronic obstructive pulmonary disease) (H)       order for DME     1 Device    Equipment being ordered: Vision Air machine and all associated supplies  Patient is on home oxygen    COPD (chronic obstructive pulmonary disease) (H)       simvastatin 20 MG tablet    ZOCOR     90 tablet    TAKE ONE TABLET BY MOUTH ONCE DAILY AT BEDTIME.    Hyperlipidemia LDL goal <130       * SYMBICORT 160-4.5 MCG/ACT Inhaler   Generic drug:  budesonide-formoterol     1 Inhaler    INHALE TWO PUFFS BY MOUTH TWICE DAILY - NEEDS TO BE SEEN IN CLINIC FOR MORE REFILLS.    Chronic obstructive pulmonary disease, unspecified COPD type (H)       * budesonide-formoterol 160-4.5 MCG/ACT Inhaler    SYMBICORT    1 Inhaler    INHALE TWO PUFFS BY MOUTH TWICE DAILY    Chronic obstructive pulmonary disease, unspecified COPD type (H)       tiotropium 18 MCG capsule    SPIRIVA HANDIHALER    90 capsule    INHALE ONE DOSE BY MOUTH ONCE DAILY    Chronic obstructive pulmonary disease, unspecified COPD type (H)       vitamin D 2000 UNITS Caps      Take  by mouth daily.        * Notice:  This list has 4 medication(s) that are the same as other medications prescribed for you. Read the directions carefully, and ask your doctor or other care provider to review them with you.

## 2017-10-18 DIAGNOSIS — I10 ESSENTIAL HYPERTENSION WITH GOAL BLOOD PRESSURE LESS THAN 140/90: ICD-10-CM

## 2017-10-18 RX ORDER — ATENOLOL 25 MG/1
TABLET ORAL
Qty: 30 TABLET | Refills: 0 | Status: SHIPPED | OUTPATIENT
Start: 2017-10-18 | End: 2018-10-22

## 2017-10-18 NOTE — TELEPHONE ENCOUNTER
Routing refill request to provider for review/approval because:  Elevated BP measurements over goal, no plan noted in chart regarding HTN      Judy Lagos RN  [facility]

## 2017-10-24 ENCOUNTER — TELEPHONE (OUTPATIENT)
Dept: NURSING | Facility: CLINIC | Age: 82
End: 2017-10-24

## 2017-11-07 ENCOUNTER — OFFICE VISIT (OUTPATIENT)
Dept: OPHTHALMOLOGY | Facility: CLINIC | Age: 82
End: 2017-11-07
Payer: COMMERCIAL

## 2017-11-07 DIAGNOSIS — H40.1492 PSEUDOEXFOLIATION GLAUCOMA, MODERATE STAGE: Primary | ICD-10-CM

## 2017-11-07 PROCEDURE — 92133 CPTRZD OPH DX IMG PST SGM ON: CPT | Performed by: OPHTHALMOLOGY

## 2017-11-07 PROCEDURE — 92012 INTRM OPH EXAM EST PATIENT: CPT | Performed by: OPHTHALMOLOGY

## 2017-11-07 PROCEDURE — 92083 EXTENDED VISUAL FIELD XM: CPT | Performed by: OPHTHALMOLOGY

## 2017-11-07 ASSESSMENT — VISUAL ACUITY
OS_SC+: -1
OD_SC+: -1
OD_SC: 20/25
OS_SC: 20/30
METHOD: SNELLEN - LINEAR

## 2017-11-07 ASSESSMENT — TONOMETRY
OS_IOP_MMHG: 14
OD_IOP_MMHG: 15
IOP_METHOD: APPLANATION

## 2017-11-07 ASSESSMENT — SLIT LAMP EXAM - LIDS: COMMENTS: 1+ DERMATOCHALASIS - UPPER LID

## 2017-11-07 ASSESSMENT — PACHYMETRY
OS_CT(UM): 540
OD_CT(UM): 600

## 2017-11-07 ASSESSMENT — EXTERNAL EXAM - RIGHT EYE: OD_EXAM: NORMAL

## 2017-11-07 ASSESSMENT — EXTERNAL EXAM - LEFT EYE: OS_EXAM: NORMAL

## 2017-11-07 NOTE — PATIENT INSTRUCTIONS
Continue Dorzolamide both eyes twice a day and Latanoprost both eyes every evening.  Return visit 6 months for a complete exam.      Clay Amos M.D.

## 2017-11-07 NOTE — MR AVS SNAPSHOT
"              After Visit Summary   11/7/2017    Jeb Palomino    MRN: [id number]           Patient Information     Date Of Birth          1935        Visit Information        Provider Department      11/7/2017 1:45 PM Clay Amos MD [facility]        Today's Diagnoses     Pseudoexfoliation glaucoma, moderate stage    -  1      Care Instructions    Continue Dorzolamide both eyes twice a day and Latanoprost both eyes every evening.  Return visit 6 months for a complete exam      Clay Amos M.D.            Follow-ups after your visit        Who to contact     If you have questions or need follow up information about today's clinic visit or your schedule please contact [facility] directly at [phone].  Normal or non-critical lab and imaging results will be communicated to you by MyChart, letter or phone within 4 business days after the clinic has received the results. If you do not hear from us within 7 days, please contact the clinic through Compliance 360hart or phone. If you have a critical or abnormal lab result, we will notify you by phone as soon as possible.  Submit refill requests through BitRock or call your pharmacy and they will forward the refill request to us. Please allow 3 business days for your refill to be completed.          Additional Information About Your Visit        MyChart Information     BitRock lets you send messages to your doctor, view your test results, renew your prescriptions, schedule appointments and more. To sign up, go to [email or web address] . Click on \"Log in\" on the left side of the screen, which will take you to the Welcome page. Then click on \"Sign up Now\" on the right side of the page.     You will be asked to enter the access code listed below, as well as some personal information. Please follow the directions to create your username and password.     Your access code is: [email or web address]  Expires: 11/27/2017  9:37 AM     Your access code " will  in 90 days. If you need help or a new code, please call your [place] clinic or [phone].        Care EveryWhere ID     This is your Care EveryWhere ID. This could be used by other organizations to access your [place] medical records  [id number]         Blood Pressure from Last 3 Encounters:   17 155/69   17 118/65   17 164/71    Weight from Last 3 Encounters:   17 48.1 kg (106 lb)   17 47.9 kg (105 lb 8 oz)   17 49 kg (108 lb)              We Performed the Following     HC COMPUTERIZED OPHTHALMIC IMAGING OPTIC NERVE     VISUAL FIELDS EXAM (EXTENDED)        Primary Care Provider Office Phone # Fax #    Jose Doyle [phone]       [address] [facility] [address]        Equal Access to Services     [facility]: Hadii aad ku hadasho Soomaali, waaxda luqadaha, qaybta kaalmada adeegyada, ann kinney haymagdalena manzo . So [facility] [phone].    ATENCIÓN: Si habla español, tiene a molina disposición servicios gratuitos de asistencia lingüística. Llame al [phone].    We comply with applicable federal civil rights laws and [place] laws. We do not discriminate on the basis of race, color, national origin, age, disability, sex, sexual orientation, or gender identity.            Thank you!     Thank you for choosing [facility]  for your care. Our goal is always to provide you with excellent care. Hearing back from our patients is one way we can continue to improve our services. Please take a few minutes to complete the written survey that you may receive in the mail after your visit with us. Thank you!             Your Updated Medication List - Protect others around you: Learn how to safely use, store and throw away your medicines at www.disposemymeds.org.          This list is accurate as of: 17  2:43 PM.  Always use your most recent med list.                   Brand Name Dispense Instructions for  use Diagnosis    * albuterol 108 (90 BASE) MCG/ACT Inhaler    PROAIR HFA    3 Inhaler    INHALE TWO PUFFS BY MOUTH EVERY 6 HOURS AS NEEDED FOR  SHORTNESS  OF  BREATH  /  DYSPNEA    Chronic obstructive pulmonary disease, unspecified COPD type (H)       * albuterol (2.5 MG/3ML) 0.083% neb solution     1 Box    Take 1 vial (2.5 mg) by nebulization every 4 hours as needed for shortness of breath / dyspnea    Chronic obstructive pulmonary disease, unspecified COPD type (H)       aspirin 81 MG EC tablet     40 tablet    Take 1 tablet (81 mg) by mouth daily        * atenolol 25 MG tablet    TENORMIN    180 tablet    TAKE TWO TABLETS BY MOUTH ONCE DAILY    Essential hypertension with goal blood pressure less than 140/90       * atenolol 25 MG tablet    TENORMIN    30 tablet    TAKE TWO TABLETS BY MOUTH ONCE DAILY**NEEDS TO BE SEEN IN CLINIC**    Essential hypertension with goal blood pressure less than 140/90       dorzolamide 2 % ophthalmic solution    TRUSOPT    3 Bottle    Place 1 drop into both eyes 2 times daily    Pseudoexfoliation glaucoma, moderate stage       latanoprost 0.005 % ophthalmic solution    XALATAN    7.5 mL    Place 1 drop into both eyes At Bedtime    Pseudoexfoliation glaucoma, moderate stage       MULTI VIT/FL PO      1 TABLET DAILY        order for DME     1 each    Equipment being ordered: Oxygen.  Oxygen to be titrated per RT to keep SaO2 > 90%. Enroll patient in COPD program for continued follow up. 12/31/12 SaO2 90% room air rest, 76% room air with activity, 88% 2lpm continuous flow with activity.    COPD (chronic obstructive pulmonary disease) (H)       order for DME     1 Device    Equipment being ordered: Vision Air machine and all associated supplies  Patient is on home oxygen    COPD (chronic obstructive pulmonary disease) (H)       simvastatin 20 MG tablet    ZOCOR    90 tablet    TAKE ONE TABLET BY MOUTH ONCE DAILY AT BEDTIME.    Hyperlipidemia LDL goal <130       * SYMBICORT 160-4.5  MCG/ACT Inhaler   Generic drug:  budesonide-formoterol     1 Inhaler    INHALE TWO PUFFS BY MOUTH TWICE DAILY - NEEDS TO BE SEEN IN CLINIC FOR MORE REFILLS.    Chronic obstructive pulmonary disease, unspecified COPD type (H)       * budesonide-formoterol 160-4.5 MCG/ACT Inhaler    SYMBICORT    1 Inhaler    INHALE TWO PUFFS BY MOUTH TWICE DAILY    Chronic obstructive pulmonary disease, unspecified COPD type (H)       tiotropium 18 MCG capsule    SPIRIVA HANDIHALER    90 capsule    INHALE ONE DOSE BY MOUTH ONCE DAILY    Chronic obstructive pulmonary disease, unspecified COPD type (H)       vitamin D 2000 UNITS Caps      Take  by mouth daily.        * Notice:  This list has 6 medication(s) that are the same as other medications prescribed for you. Read the directions carefully, and ask your doctor or other care provider to review them with you.

## 2017-11-07 NOTE — PROGRESS NOTES
Current Eye Medications:  Dorzolamide both eyes twice a day, Latanoprost both eyes every evening.  Last drops: 10am.     Subjective:  Follow up Pseudoexfoliation Glaucoma.  Patient is here for a pressure check, OCT, and visual field.  No vision changes or concerns since her last appointment.      Objective:  See Ophthalmology Exam.       Assessment:  Stable intraocular pressure both eyes.  Andres Visual Field mostly stable both eyes. Glaucoma OCT stable configuration both eyes.      Plan:  Continue Dorzolamide both eyes twice a day and Latanoprost both eyes every evening.  Return visit 6 months for a complete exam.      Clay Amos M.D.

## 2018-03-08 ENCOUNTER — TELEPHONE (OUTPATIENT)
Dept: FAMILY MEDICINE | Facility: CLINIC | Age: 83
End: 2018-03-08

## 2018-03-08 NOTE — TELEPHONE ENCOUNTER
Forms received from: [employer]   Phone number listed: [phone]   Fax listed: [phone]  Date received: 03/08/2018  Form description: oxygen annual renewal   Once forms are completed, please return to [employer] via fax.  Form placed: in providers folder  Maria R Jiang

## 2018-07-03 ENCOUNTER — OFFICE VISIT (OUTPATIENT)
Dept: OPHTHALMOLOGY | Facility: CLINIC | Age: 83
End: 2018-07-03
Payer: COMMERCIAL

## 2018-07-03 DIAGNOSIS — H40.1492 PSEUDOEXFOLIATION GLAUCOMA, MODERATE STAGE: ICD-10-CM

## 2018-07-03 DIAGNOSIS — H52.4 PRESBYOPIA: ICD-10-CM

## 2018-07-03 DIAGNOSIS — H34.231 RETINAL ARTERY BRANCH OCCLUSION OF RIGHT EYE: ICD-10-CM

## 2018-07-03 DIAGNOSIS — Z96.1 PSEUDOPHAKIA: ICD-10-CM

## 2018-07-03 DIAGNOSIS — Z01.01 ENCOUNTER FOR EXAMINATION OF EYES AND VISION WITH ABNORMAL FINDINGS: Primary | ICD-10-CM

## 2018-07-03 PROCEDURE — 92015 DETERMINE REFRACTIVE STATE: CPT | Performed by: OPHTHALMOLOGY

## 2018-07-03 PROCEDURE — 92014 COMPRE OPH EXAM EST PT 1/>: CPT | Performed by: OPHTHALMOLOGY

## 2018-07-03 ASSESSMENT — REFRACTION_MANIFEST
OD_AXIS: 160
OD_ADD: +2.75
OD_SPHERE: -0.25
OS_SPHERE: -1.00
OS_AXIS: 180
OS_ADD: +2.75
OD_CYLINDER: +0.50
OS_CYLINDER: +0.75

## 2018-07-03 ASSESSMENT — CONF VISUAL FIELD: OD_SUPERIOR_NASAL_RESTRICTION: 3

## 2018-07-03 ASSESSMENT — VISUAL ACUITY
METHOD: SNELLEN - LINEAR
OS_SC: 20/40+2
OD_CC: J2+
OD_SC: 20/20-2
OS_CC: J1

## 2018-07-03 ASSESSMENT — TONOMETRY
IOP_METHOD: TONOPEN
OD_IOP_MMHG: 17
OS_IOP_MMHG: 17

## 2018-07-03 ASSESSMENT — REFRACTION_WEARINGRX
SPECS_TYPE: READERS
OD_SPHERE: +2.00
OS_CYLINDER: SPHERE
OD_CYLINDER: SPHERE
OS_SPHERE: +2.00

## 2018-07-03 NOTE — LETTER
7/3/2018         RE: Jeb Palomino  [address] [address]        Dear Colleague,    Thank you for referring your patient, Jeb Palomino, to the [facility]. Please see a copy of my visit note below.     Current Eye Medications: latanoprost nightly both eyes and trusopt twice a day both eyes.     Subjective:  Comprehensive eye exam.   Pt reports that that she is seeing good from her point of view.    She states compliant with drops.     Objective:  See Ophthalmology Exam.       Assessment:  Intraocular pressure up today both eyes but stable dilated exam.      ICD-10-CM    1. Encounter for examination of eyes and vision with abnormal findings Z01.01 REFRACTIVE STATUS   2. Presbyopia H52.4 REFRACTIVE STATUS   3. 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 Pseudophakia, ou Z96.1    5. Hx of hemiretinal artery occlusion, inferior, od H34.231         Plan:  Continue same medication  Continue same glasses  Return visit 4 months for intraocular pressure check, glaucoma OCT, and Andres Visual Field.  Clay Amos M.D.  [phone]           Again, thank you for allowing me to participate in the care of your patient.        Sincerely,        Clay Amos MD

## 2018-07-03 NOTE — MR AVS SNAPSHOT
After Visit Summary   7/3/2018    Jeb Palomino    MRN: [id number]           Patient Information     Date Of Birth          1935        Visit Information        Provider Department      7/3/2018 2:00 PM Clay Amos MD [facility]        Today's Diagnoses     Encounter for examination of eyes and vision with abnormal findings    -  1    Presbyopia          Care Instructions    Continue same medication  Continue same glasses  Return visit 4 months for intraocular pressure check, glaucoma OCT, and Andres Visual Field.  Clay Amos M.D.  [phone]            Follow-ups after your visit        Who to contact     If you have questions or need follow up information about today's clinic visit or your schedule please contact [facility] directly at [phone].  Normal or non-critical lab and imaging results will be communicated to you by MyChart, letter or phone within 4 business days after the clinic has received the results. If you do not hear from us within 7 days, please contact the clinic through MyChart or phone. If you have a critical or abnormal lab result, we will notify you by phone as soon as possible.  Submit refill requests through [employer] or call your pharmacy and they will forward the refill request to us. Please allow 3 business days for your refill to be completed.          Additional Information About Your Visit        Care EveryWhere ID     This is your Care EveryWhere ID. This could be used by other organizations to access your [place] medical records  [id number]         Blood Pressure from Last 3 Encounters:   09/21/17 155/69   09/12/17 118/65   08/29/17 164/71    Weight from Last 3 Encounters:   09/21/17 48.1 kg (106 lb)   09/12/17 47.9 kg (105 lb 8 oz)   08/29/17 49 kg (108 lb)              Today, you had the following     No orders found for display       Primary Care Provider Office Phone # Fax #    Jose Doyle [phone]       [address] [facility] [address]        Equal Access to Services     NANCY POLLOCK : Hadii ling abbott hadankushsandra Somaricarmen, waaxda luqadaha, qaybta kaalmada adesteve, ann rosein hayaacaitlin carsonrene beatty anais best. So [facility] [phone].    ATENCIÓN: Si habla español, tiene a molina disposición servicios gratuitos de asistencia lingüística. Llame al [phone].    We comply with applicable federal civil rights laws and [place] laws. We do not discriminate on the basis of race, color, national origin, age, disability, sex, sexual orientation, or gender identity.            Thank you!     Thank you for choosing [facility]  for your care. Our goal is always to provide you with excellent care. Hearing back from our patients is one way we can continue to improve our services. Please take a few minutes to complete the written survey that you may receive in the mail after your visit with us. Thank you!             Your Updated Medication List - Protect others around you: Learn how to safely use, store and throw away your medicines at www.disposemymeds.org.          This list is accurate as of 7/3/18  3:13 PM.  Always use your most recent med list.                   Brand Name Dispense Instructions for use Diagnosis    * albuterol 108 (90 Base) MCG/ACT Inhaler    PROAIR HFA    3 Inhaler    INHALE TWO PUFFS BY MOUTH EVERY 6 HOURS AS NEEDED FOR  SHORTNESS  OF  BREATH  /  DYSPNEA    Chronic obstructive pulmonary disease, unspecified COPD type (H)       * albuterol (2.5 MG/3ML) 0.083% neb solution     1 Box    Take 1 vial (2.5 mg) by nebulization every 4 hours as needed for shortness of breath / dyspnea    Chronic obstructive pulmonary disease, unspecified COPD type (H)       aspirin 81 MG EC tablet     40 tablet    Take 1 tablet (81 mg) by mouth daily        * atenolol 25 MG tablet    TENORMIN    180 tablet    TAKE TWO TABLETS BY MOUTH ONCE DAILY    Essential hypertension with goal blood  pressure less than 140/90       * atenolol 25 MG tablet    TENORMIN    30 tablet    TAKE TWO TABLETS BY MOUTH ONCE DAILY**NEEDS TO BE SEEN IN CLINIC**    Essential hypertension with goal blood pressure less than 140/90       dorzolamide 2 % ophthalmic solution    TRUSOPT    3 Bottle    Place 1 drop into both eyes 2 times daily    Pseudoexfoliation glaucoma, moderate stage       latanoprost 0.005 % ophthalmic solution    XALATAN    7.5 mL    Place 1 drop into both eyes At Bedtime    Pseudoexfoliation glaucoma, moderate stage       MULTI VIT/FL PO      1 TABLET DAILY        order for DME     1 each    Equipment being ordered: Oxygen.  Oxygen to be titrated per RT to keep SaO2 > 90%. Enroll patient in COPD program for continued follow up. 12/31/12 SaO2 90% room air rest, 76% room air with activity, 88% 2lpm continuous flow with activity.    COPD (chronic obstructive pulmonary disease) (H)       order for DME     1 Device    Equipment being ordered: Vision Air machine and all associated supplies  Patient is on home oxygen    COPD (chronic obstructive pulmonary disease) (H)       simvastatin 20 MG tablet    ZOCOR    90 tablet    TAKE ONE TABLET BY MOUTH ONCE DAILY AT BEDTIME.    Hyperlipidemia LDL goal <130       * SYMBICORT 160-4.5 MCG/ACT Inhaler   Generic drug:  budesonide-formoterol     1 Inhaler    INHALE TWO PUFFS BY MOUTH TWICE DAILY - NEEDS TO BE SEEN IN CLINIC FOR MORE REFILLS.    Chronic obstructive pulmonary disease, unspecified COPD type (H)       * budesonide-formoterol 160-4.5 MCG/ACT Inhaler    SYMBICORT    1 Inhaler    INHALE TWO PUFFS BY MOUTH TWICE DAILY    Chronic obstructive pulmonary disease, unspecified COPD type (H)       tiotropium 18 MCG capsule    SPIRIVA HANDIHALER    90 capsule    INHALE ONE DOSE BY MOUTH ONCE DAILY    Chronic obstructive pulmonary disease, unspecified COPD type (H)       vitamin D 2000 units Caps      Take  by mouth daily.        * Notice:  This list has 6 medication(s) that  are the same as other medications prescribed for you. Read the directions carefully, and ask your doctor or other care provider to review them with you.

## 2018-07-03 NOTE — PATIENT INSTRUCTIONS
Continue same medication  Continue same glasses  Return visit 4 months for intraocular pressure check, glaucoma OCT, and Andres Visual Field.  Clay Amos M.D.  [phone]

## 2018-07-03 NOTE — PROGRESS NOTES
Current Eye Medications: latanoprost nightly both eyes and trusopt twice a day both eyes.     Subjective:  Comprehensive eye exam.   Pt reports that that she is seeing good from her point of view.    She states compliant with drops.     Objective:  See Ophthalmology Exam.       Assessment:  Intraocular pressure up today both eyes but stable dilated exam.      ICD-10-CM    1. Encounter for examination of eyes and vision with abnormal findings Z01.01 REFRACTIVE STATUS   2. Presbyopia H52.4 REFRACTIVE STATUS   3. 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 Pseudophakia, ou Z96.1    5. Hx of hemiretinal artery occlusion, inferior, od H34.231         Plan:  Continue same medication  Continue same glasses  Return visit 4 months for intraocular pressure check, glaucoma OCT, and Andres Visual Field.  Clay Amos M.D.  [phone]

## 2018-07-04 PROBLEM — H34.231 RETINAL ARTERY BRANCH OCCLUSION OF RIGHT EYE: Status: ACTIVE | Noted: 2018-07-04

## 2018-07-04 ASSESSMENT — SLIT LAMP EXAM - LIDS: COMMENTS: 1+ DERMATOCHALASIS - UPPER LID

## 2018-07-04 ASSESSMENT — EXTERNAL EXAM - RIGHT EYE: OD_EXAM: NORMAL

## 2018-07-04 ASSESSMENT — EXTERNAL EXAM - LEFT EYE: OS_EXAM: NORMAL

## 2018-07-04 ASSESSMENT — CUP TO DISC RATIO
OS_RATIO: 0.8
OD_RATIO: 0.8

## 2018-09-04 DIAGNOSIS — H40.1492 PSEUDOEXFOLIATION GLAUCOMA, MODERATE STAGE: ICD-10-CM

## 2018-09-04 RX ORDER — LATANOPROST 50 UG/ML
1 SOLUTION/ DROPS OPHTHALMIC AT BEDTIME
Qty: 7.5 ML | Refills: 4 | Status: SHIPPED | OUTPATIENT
Start: 2018-09-04 | End: 2019-09-06

## 2018-09-04 RX ORDER — DORZOLAMIDE HCL 20 MG/ML
1 SOLUTION/ DROPS OPHTHALMIC 2 TIMES DAILY
Qty: 3 BOTTLE | Refills: 4 | Status: SHIPPED | OUTPATIENT
Start: 2018-09-04 | End: 2020-01-01

## 2018-09-04 NOTE — TELEPHONE ENCOUNTER
Fax received from pharmacy requesting refills of dorzolamide (TRUSOPT) 2 % ophthalmic solution and latanoprost (XALATAN) 0.005 % ophthalmic solution.

## 2018-09-18 ENCOUNTER — TRANSFERRED RECORDS (OUTPATIENT)
Dept: HEALTH INFORMATION MANAGEMENT | Facility: CLINIC | Age: 83
End: 2018-09-18

## 2018-10-10 DIAGNOSIS — J44.9 CHRONIC OBSTRUCTIVE PULMONARY DISEASE, UNSPECIFIED COPD TYPE (H): ICD-10-CM

## 2018-10-10 NOTE — TELEPHONE ENCOUNTER
"Requested Prescriptions   Pending Prescriptions Disp Refills     SYMBICORT 160-4.5 MCG/ACT Inhaler [Pharmacy Med Name: SYMBICORT 160-4.5   AER]  11    Last Written Prescription Date:  10-9-18  Last Fill Quantity: 1,  # refills: 0   Last office visit: 9/21/2017 with prescribing provider:  9-21-17   Future Office Visit:   Next 5 appointments (look out 90 days)     Nov 15, 2018  2:15 PM CST   Return Visit with Clay Amos MD   [facility] ([address] [address]   [phone]                  Sig: INHALE TWO PUFFS BY MOUTH TWICE DAILY    Inhaled Steroids Protocol Failed    10/10/2018  5:31 AM       Failed - Recent (12 mo) or future (30 days) visit within the authorizing provider's specialty    Patient had office visit in the last 12 months or has a visit in the next 30 days with authorizing provider or within the authorizing provider's specialty.  See \"Patient Info\" tab in inbasket, or \"Choose Columns\" in Meds & Orders section of the refill encounter.           Passed - Patient is age 12 or older          "

## 2018-10-12 ENCOUNTER — ALLIED HEALTH/NURSE VISIT (OUTPATIENT)
Dept: NURSING | Facility: CLINIC | Age: 83
End: 2018-10-12
Payer: COMMERCIAL

## 2018-10-12 DIAGNOSIS — Z23 NEED FOR PROPHYLACTIC VACCINATION AND INOCULATION AGAINST INFLUENZA: Primary | ICD-10-CM

## 2018-10-12 PROCEDURE — 90662 IIV NO PRSV INCREASED AG IM: CPT

## 2018-10-12 PROCEDURE — G0008 ADMIN INFLUENZA VIRUS VAC: HCPCS

## 2018-10-12 PROCEDURE — 99207 ZZC NO CHARGE NURSE ONLY: CPT

## 2018-10-12 RX ORDER — BUDESONIDE AND FORMOTEROL FUMARATE DIHYDRATE 160; 4.5 UG/1; UG/1
AEROSOL RESPIRATORY (INHALATION)
Refills: 11 | OUTPATIENT
Start: 2018-10-12

## 2018-10-12 NOTE — MR AVS SNAPSHOT
"              After Visit Summary   10/12/2018    Jeb Palomino    MRN: [id number]           Patient Information     Date Of Birth          1935        Visit Information        Provider Department      10/12/2018 1:15 PM CARE COORDINATOR CP [facility]        Today's Diagnoses     Need for prophylactic vaccination and inoculation against influenza    -  1       Follow-ups after your visit        Your next 10 appointments already scheduled     Oct 22, 2018  1:40 PM CDT   SHORT with Jose Doyle MD   [facility]    [address] [facility] [address] [phone]            Nov 15, 2018  2:15 PM CST   Return Visit with Clay Amos MD   [facility] [address] [phone]              Who to contact     If you have questions or need follow up information about today's clinic visit or your schedule please contact [facility] directly at [phone].  Normal or non-critical lab and imaging results will be communicated to you by MyChart, letter or phone within 4 business days after the clinic has received the results. If you do not hear from us within 7 days, please contact the clinic through Bestowedhart or phone. If you have a critical or abnormal lab result, we will notify you by phone as soon as possible.  Submit refill requests through [email or web address] or call your pharmacy and they will forward the refill request to us. Please allow 3 business days for your refill to be completed.          Additional Information About Your Visit        BestowedharTrulioo Information     [email or web address] lets you send messages to your doctor, view your test results, renew your prescriptions, schedule appointments and more. To sign up, go to [email or web address] . Click on \"Log in\" on the left side of the screen, which will take you to the " "Welcome page. Then click on \"Sign up Now\" on the right side of the page.     You will be asked to enter the access code listed below, as well as some personal information. Please follow the directions to create your username and password.     Your access code is: [email or web address]  Expires: 1/10/2019  2:00 PM     Your access code will  in 90 days. If you need help or a new code, please call your [place] clinic or [phone].        Care EveryWhere ID     This is your Care EveryWhere ID. This could be used by other organizations to access your [place] medical records  [id number]         Blood Pressure from Last 3 Encounters:   17 155/69   17 118/65   17 164/71    Weight from Last 3 Encounters:   17 106 lb (48.1 kg)   17 105 lb 8 oz (47.9 kg)   17 108 lb (49 kg)              We Performed the Following     ADMIN INFLUENZA (For MEDICARE Patients ONLY) []     FLU VACCINE, INCREASED ANTIGEN, PRESV FREE, AGE 65+ [49353]     Vaccine Administration, Initial [[id number]]        Primary Care Provider Office Phone # Fax #    Jose Doyle [phone]       [address] [facility] [address] [facility] [address] [place]. \[facility].\"" [id number]        Equal Access to Services     NANCY POLLOCK : Hadii aad ku hadasho Soomaali, waaxda luqadaha, qaybta kaalmada adeegyada, waxay nirmal haymagdalena manzo . So [facility] [phone].    ATENCIÓN: Si habla español, tiene a molina disposición servicios gratuitos de asistencia lingüística. Llame al [phone].    We comply with applicable federal civil rights laws and [place] laws. We do not discriminate on the basis of race, color, national origin, age, disability, sex, sexual orientation, or gender identity.            Thank you!     Thank you for choosing [facility]  for your care. Our goal is always to provide you with excellent care. Hearing back from our patients is one way we can continue to improve our services. Please take a few " minutes to complete the written survey that you may receive in the mail after your visit with us. Thank you!             Your Updated Medication List - Protect others around you: Learn how to safely use, store and throw away your medicines at www.disposemymeds.org.          This list is accurate as of 10/12/18  2:00 PM.  Always use your most recent med list.                   Brand Name Dispense Instructions for use Diagnosis    * albuterol 108 (90 Base) MCG/ACT inhaler    PROAIR HFA    3 Inhaler    INHALE TWO PUFFS BY MOUTH EVERY 6 HOURS AS NEEDED FOR  SHORTNESS  OF  BREATH  /  DYSPNEA    Chronic obstructive pulmonary disease, unspecified COPD type (H)       * albuterol (2.5 MG/3ML) 0.083% neb solution     1 Box    Take 1 vial (2.5 mg) by nebulization every 4 hours as needed for shortness of breath / dyspnea    Chronic obstructive pulmonary disease, unspecified COPD type (H)       aspirin 81 MG EC tablet     40 tablet    Take 1 tablet (81 mg) by mouth daily        * atenolol 25 MG tablet    TENORMIN    180 tablet    TAKE TWO TABLETS BY MOUTH ONCE DAILY    Essential hypertension with goal blood pressure less than 140/90       * atenolol 25 MG tablet    TENORMIN    30 tablet    TAKE TWO TABLETS BY MOUTH ONCE DAILY**NEEDS TO BE SEEN IN CLINIC**    Essential hypertension with goal blood pressure less than 140/90       dorzolamide 2 % ophthalmic solution    TRUSOPT    3 Bottle    Place 1 drop into both eyes 2 times daily    Pseudoexfoliation glaucoma, moderate stage       latanoprost 0.005 % ophthalmic solution    XALATAN    7.5 mL    Place 1 drop into both eyes At Bedtime    Pseudoexfoliation glaucoma, moderate stage       MULTI VIT/FL PO      1 TABLET DAILY        order for DME     1 each    Equipment being ordered: Oxygen.  Oxygen to be titrated per RT to keep SaO2 > 90%. Enroll patient in COPD program for continued follow up. 12/31/12 SaO2 90% room air rest, 76% room air with activity, 88% 2lpm continuous flow with  activity.    COPD (chronic obstructive pulmonary disease) (H)       order for DME     1 Device    Equipment being ordered: Vision Air machine and all associated supplies  Patient is on home oxygen    COPD (chronic obstructive pulmonary disease) (H)       simvastatin 20 MG tablet    ZOCOR    90 tablet    TAKE ONE TABLET BY MOUTH ONCE DAILY AT BEDTIME.    Hyperlipidemia LDL goal <130       SYMBICORT 160-4.5 MCG/ACT Inhaler   Generic drug:  budesonide-formoterol     1 Inhaler    INHALE TWO PUFFS BY MOUTH TWICE DAILY    Chronic obstructive pulmonary disease, unspecified COPD type (H)       tiotropium 18 MCG capsule    SPIRIVA HANDIHALER    90 capsule    INHALE ONE DOSE BY MOUTH ONCE DAILY    Chronic obstructive pulmonary disease, unspecified COPD type (H)       vitamin D 2000 units Caps      Take  by mouth daily.        * Notice:  This list has 4 medication(s) that are the same as other medications prescribed for you. Read the directions carefully, and ask your doctor or other care provider to review them with you.

## 2018-10-12 NOTE — TELEPHONE ENCOUNTER
Refused, as is a duplicate. Was just sent on 10/9/18 with receipt confirmed.     Flores Noriega RN

## 2018-10-12 NOTE — PROGRESS NOTES

## 2018-10-22 ENCOUNTER — OFFICE VISIT (OUTPATIENT)
Dept: FAMILY MEDICINE | Facility: CLINIC | Age: 83
End: 2018-10-22
Payer: COMMERCIAL

## 2018-10-22 VITALS
BODY MASS INDEX: 21.2 KG/M2 | WEIGHT: 108 LBS | HEART RATE: 75 BPM | DIASTOLIC BLOOD PRESSURE: 73 MMHG | TEMPERATURE: 97 F | SYSTOLIC BLOOD PRESSURE: 120 MMHG | HEIGHT: 60 IN | OXYGEN SATURATION: 93 %

## 2018-10-22 DIAGNOSIS — E78.5 HYPERLIPIDEMIA LDL GOAL <130: ICD-10-CM

## 2018-10-22 DIAGNOSIS — L85.3 XEROSIS CUTIS: ICD-10-CM

## 2018-10-22 DIAGNOSIS — R53.83 FATIGUE, UNSPECIFIED TYPE: ICD-10-CM

## 2018-10-22 DIAGNOSIS — I10 ESSENTIAL HYPERTENSION WITH GOAL BLOOD PRESSURE LESS THAN 140/90: ICD-10-CM

## 2018-10-22 DIAGNOSIS — J44.9 CHRONIC OBSTRUCTIVE PULMONARY DISEASE, UNSPECIFIED COPD TYPE (H): Primary | ICD-10-CM

## 2018-10-22 LAB
ALBUMIN SERPL-MCNC: 3.1 G/DL (ref 3.4–5)
ALP SERPL-CCNC: 175 U/L (ref 40–150)
ALT SERPL W P-5'-P-CCNC: 19 U/L (ref 0–50)
ANION GAP SERPL CALCULATED.3IONS-SCNC: 8 MMOL/L (ref 3–14)
AST SERPL W P-5'-P-CCNC: 22 U/L (ref 0–45)
BASOPHILS # BLD AUTO: 0 10E9/L (ref 0–0.2)
BASOPHILS NFR BLD AUTO: 0.3 %
BILIRUB SERPL-MCNC: 0.7 MG/DL (ref 0.2–1.3)
BUN SERPL-MCNC: 7 MG/DL (ref 7–30)
CALCIUM SERPL-MCNC: 9.2 MG/DL (ref 8.5–10.1)
CHLORIDE SERPL-SCNC: 90 MMOL/L (ref 94–109)
CO2 SERPL-SCNC: 31 MMOL/L (ref 20–32)
CREAT SERPL-MCNC: 0.87 MG/DL (ref 0.52–1.04)
DIFFERENTIAL METHOD BLD: ABNORMAL
EOSINOPHIL # BLD AUTO: 0.1 10E9/L (ref 0–0.7)
EOSINOPHIL NFR BLD AUTO: 1.8 %
ERYTHROCYTE [DISTWIDTH] IN BLOOD BY AUTOMATED COUNT: 11.3 % (ref 10–15)
GFR SERPL CREATININE-BSD FRML MDRD: 62 ML/MIN/1.7M2
GLUCOSE SERPL-MCNC: 100 MG/DL (ref 70–99)
HCT VFR BLD AUTO: 36 % (ref 35–47)
HGB BLD-MCNC: 12.3 G/DL (ref 11.7–15.7)
LYMPHOCYTES # BLD AUTO: 1.5 10E9/L (ref 0.8–5.3)
LYMPHOCYTES NFR BLD AUTO: 22.8 %
MCH RBC QN AUTO: 33.2 PG (ref 26.5–33)
MCHC RBC AUTO-ENTMCNC: 34.2 G/DL (ref 31.5–36.5)
MCV RBC AUTO: 97 FL (ref 78–100)
MONOCYTES # BLD AUTO: 0.6 10E9/L (ref 0–1.3)
MONOCYTES NFR BLD AUTO: 9.1 %
NEUTROPHILS # BLD AUTO: 4.4 10E9/L (ref 1.6–8.3)
NEUTROPHILS NFR BLD AUTO: 66 %
PLATELET # BLD AUTO: 186 10E9/L (ref 150–450)
POTASSIUM SERPL-SCNC: 4.4 MMOL/L (ref 3.4–5.3)
PROT SERPL-MCNC: 7 G/DL (ref 6.8–8.8)
RBC # BLD AUTO: 3.7 10E12/L (ref 3.8–5.2)
SODIUM SERPL-SCNC: 129 MMOL/L (ref 133–144)
TSH SERPL DL<=0.005 MIU/L-ACNC: 0.82 MU/L (ref 0.4–4)
WBC # BLD AUTO: 6.7 10E9/L (ref 4–11)

## 2018-10-22 PROCEDURE — 99214 OFFICE O/P EST MOD 30 MIN: CPT | Performed by: FAMILY MEDICINE

## 2018-10-22 PROCEDURE — 36415 COLL VENOUS BLD VENIPUNCTURE: CPT | Performed by: FAMILY MEDICINE

## 2018-10-22 PROCEDURE — 80053 COMPREHEN METABOLIC PANEL: CPT | Performed by: FAMILY MEDICINE

## 2018-10-22 PROCEDURE — 84443 ASSAY THYROID STIM HORMONE: CPT | Performed by: FAMILY MEDICINE

## 2018-10-22 PROCEDURE — 85025 COMPLETE CBC W/AUTO DIFF WBC: CPT | Performed by: FAMILY MEDICINE

## 2018-10-22 RX ORDER — TIOTROPIUM BROMIDE 18 UG/1
CAPSULE ORAL; RESPIRATORY (INHALATION)
Qty: 90 CAPSULE | Refills: 3 | Status: SHIPPED | OUTPATIENT
Start: 2018-10-22 | End: 2019-06-03

## 2018-10-22 RX ORDER — BUDESONIDE AND FORMOTEROL FUMARATE DIHYDRATE 160; 4.5 UG/1; UG/1
AEROSOL RESPIRATORY (INHALATION)
Qty: 1 INHALER | Refills: 11 | Status: SHIPPED | OUTPATIENT
Start: 2018-10-22 | End: 2019-06-03

## 2018-10-22 RX ORDER — ATENOLOL 25 MG/1
TABLET ORAL
Qty: 90 TABLET | Refills: 3 | Status: SHIPPED | OUTPATIENT
Start: 2018-10-22 | End: 2019-05-10

## 2018-10-22 RX ORDER — SIMVASTATIN 20 MG
TABLET ORAL
Qty: 90 TABLET | Refills: 3 | Status: SHIPPED | OUTPATIENT
Start: 2018-10-22 | End: 2019-06-03

## 2018-10-22 RX ORDER — ALBUTEROL SULFATE 90 UG/1
AEROSOL, METERED RESPIRATORY (INHALATION)
Qty: 3 INHALER | Refills: 3 | Status: SHIPPED | OUTPATIENT
Start: 2018-10-22 | End: 2019-06-03

## 2018-10-22 RX ORDER — ALBUTEROL SULFATE 0.83 MG/ML
2.5 SOLUTION RESPIRATORY (INHALATION) EVERY 4 HOURS PRN
Qty: 1 BOX | Refills: 5 | Status: SHIPPED | OUTPATIENT
Start: 2018-10-22

## 2018-10-22 ASSESSMENT — PAIN SCALES - GENERAL: PAINLEVEL: NO PAIN (0)

## 2018-10-22 NOTE — LETTER
[facility]   [address] [place]  [address]  [phone]                                   October 24, 2018    Jeb Palomino  [address] [facility] [address]        Dear Jeb,    The albumin ( a measure of protein in your body) is a little low.  Make sure you get enough protein in your diet.    Other labs are stable.    Results for orders placed or performed in visit on 10/22/18   Comprehensive metabolic panel   Result Value Ref Range    Sodium 129 (L) 133 - 144 mmol/L    Potassium 4.4 3.4 - 5.3 mmol/L    Chloride 90 (L) 94 - 109 mmol/L    Carbon Dioxide 31 20 - 32 mmol/L    Anion Gap 8 3 - 14 mmol/L    Glucose 100 (H) 70 - 99 mg/dL    Urea Nitrogen 7 7 - 30 mg/dL    Creatinine 0.87 0.52 - 1.04 mg/dL    GFR Estimate 62 >60 mL/min/1.7m2    GFR Estimate If Black 75 >60 mL/min/1.7m2    Calcium 9.2 8.5 - 10.1 mg/dL    Bilirubin Total 0.7 0.2 - 1.3 mg/dL    Albumin 3.1 (L) 3.4 - 5.0 g/dL    Protein Total 7.0 6.8 - 8.8 g/dL    Alkaline Phosphatase 175 (H) 40 - 150 U/L    ALT 19 0 - 50 U/L    AST 22 0 - 45 U/L   CBC with platelets differential   Result Value Ref Range    WBC 6.7 4.0 - 11.0 10e9/L    RBC Count 3.70 (L) 3.8 - 5.2 10e12/L    Hemoglobin 12.3 11.7 - 15.7 g/dL    Hematocrit 36.0 35.0 - 47.0 %    MCV 97 78 - 100 fl    MCH 33.2 (H) 26.5 - 33.0 pg    MCHC 34.2 31.5 - 36.5 g/dL    RDW 11.3 10.0 - 15.0 %    Platelet Count 186 150 - 450 10e9/L    % Neutrophils 66.0 %    % Lymphocytes 22.8 %    % Monocytes 9.1 %    % Eosinophils 1.8 %    % Basophils 0.3 %    Absolute Neutrophil 4.4 1.6 - 8.3 10e9/L    Absolute Lymphocytes 1.5 0.8 - 5.3 10e9/L    Absolute Monocytes 0.6 0.0 - 1.3 10e9/L    Absolute Eosinophils 0.1 0.0 - 0.7 10e9/L    Absolute Basophils 0.0 0.0 - 0.2 10e9/L    Diff Method Automated Method    TSH with free T4 reflex   Result Value Ref Range    TSH 0.82 0.40 - 4.00 mU/L       If you have any questions please call the clinic at  [phone]    Sincerely,    Jose Doyle MD  hnr

## 2018-10-22 NOTE — LETTER
My COPD Action Plan     Name: Jeb Palomino    YOB: 1935   Date: 10/22/2018    My doctor: Jose Doyle MD   My clinic: [address] [address] [phone]  My Controller Medicine: spririva and symbicort   Dose: as prescribed      My Rescue Medicine: albuterol   Dose: as needed      My Flare Up Medicine: none   Dose:   FEV-1 (no units)   Date Value   11/06/2012 30     FEV1/FVC (no units)   Date Value   11/06/2012 66      My COPD Severity: Severe = FeV1 < 30%-49%      Use of Oxygen: As Previously Prescribed     Make sure you've had your pneumonia   vaccines.          GREEN ZONE       Doing well today      Usual level of activity and exercise    Usual amount of cough and mucus    No shortness of breath    Usual level of health (thinking clearly, sleeping well, feel like eating) Actions:      Take daily medicines    Use oxygen as prescribed    Follow regular exercise and diet plan    Avoid cigarette smoke and other irritants that harm the lungs           YELLOW ZONE          Having a bad day or flare up      Short of breath more than usual    A lot more sputum (mucus) than usual    Sputum looks yellow, green, tan, brown or bloody    More coughing or wheezing    Fever or chills    Less energy; trouble completing activities    Trouble thinking or focusing    Using quick relief inhaler or nebulizer more often    Poor sleep; symptoms wake me up    Do not feel like eating Actions:      Get plenty of rest    Take daily medicines    Use quick relief inhaler every 4 hours    If you use oxygen, call you doctor to see if you should adjust your oxygen    Do breathing exercises or other things to help you relax    Let a loved one, friend or neighbor know you are feeling worse    Call your care team if you have 2 or more symptoms.  Start taking steroids or antibiotics if directed by your care team           RED ZONE       Need medical  care now      Severe shortness of breath (feel you can't breathe)    Fever, chills    Not enough breath to do any activity    Trouble coughing up mucus, walking or talking    Blood in mucus    Frequent coughing   Rescue medicines are not working    Not able to sleep because of breathing    Feel confused or drowsy    Chest pain    Actions:      Call your health care team.  If you cannot reach your care team, call 911 or go to the emergency room.        Annual Reminders:  Meet with Care Team, Flu Shot every Fall  Pharmacy: [facility] [place] - [address]

## 2018-10-22 NOTE — PROGRESS NOTES
SUBJECTIVE:   Jeb Palomino is a 83 year old female who presents to clinic today for the following health issues:      Hypertension Follow-up      Outpatient blood pressures are being checked at home.  Results are in normal range.    Low Salt Diet: not monitoring salt      Amount of exercise or physical activity: None    Problems taking medications regularly: No    Medication side effects: none    Diet: regular (no restrictions)             Problem list and histories reviewed & adjusted, as indicated.  Additional history: as documented         Reviewed and updated as needed this visit by clinical staff  Tobacco  Allergies  Meds  Med Hx  Surg Hx  Fam Hx  Soc Hx      Reviewed and updated as needed this visit by Provider          back flaring up    Breathing okay    On oxygen    Occasionally takes off for short periods    Eating and drinking okay     Bowels/ bladder okay    Sleeping okay        Physical Exam   Constitutional: She is oriented to person, place, and time and well-developed, well-nourished, and in no distress. No distress.   HENT:   Head: Normocephalic and atraumatic.   Neck: Carotid bruit is not present.   Cardiovascular: Normal rate, regular rhythm, normal heart sounds and intact distal pulses.  Exam reveals no gallop and no friction rub.    No murmur heard.  Pulmonary/Chest: Effort normal and breath sounds normal.   Musculoskeletal: She exhibits no edema.   Neurological: She is alert and oriented to person, place, and time.   Skin: Skin is dry. She is not diaphoretic.   Psychiatric: Mood and affect normal.     On oxygen per nasal cannula    ASSESSMENT / PLAN:  (J44.9) Chronic obstructive pulmonary disease, unspecified COPD type (H)  (primary encounter diagnosis)  Comment: refill meds.  Encouraged her to gradually increase walking/ exercise as able.  Plan: COPD ACTION PLAN, albuterol (2.5 MG/3ML) 0.083%        neb solution, albuterol (PROAIR HFA) 108 (90         Base) MCG/ACT inhaler,  budesonide-formoterol         (SYMBICORT) 160-4.5 MCG/ACT Inhaler, tiotropium        (SPIRIVA HANDIHALER) 18 MCG capsule             (I10) Essential hypertension with goal blood pressure less than 140/90  Comment: at goal  Plan: atenolol (TENORMIN) 25 MG tablet, Comprehensive        metabolic panel        No change ; recheck meds     (E78.5) Hyperlipidemia LDL goal <130  Comment: refill med   Plan: simvastatin (ZOCOR) 20 MG tablet             (R53.83) Fatigue, unspecified type  Comment: check labs   Plan: CBC with platelets differential, TSH with free         T4 reflex             (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    Jose Doyle MD

## 2018-10-22 NOTE — PATIENT INSTRUCTIONS
Increase exercise/ walking as able    Advise getting a finger monitor to check oxygen    Continue same medications    We will send you lab results

## 2018-10-22 NOTE — MR AVS SNAPSHOT
After Visit Summary   10/22/2018    Jeb Palomino    MRN: [id number]           Patient Information     Date Of Birth          1935        Visit Information        Provider Department      10/22/2018 1:40 PM Jose Doyle MD [facility]        Today's Diagnoses     Chronic obstructive pulmonary disease, unspecified COPD type (H)    -  1    Essential hypertension with goal blood pressure less than 140/90        Hyperlipidemia LDL goal <130        Fatigue, unspecified type          Care Instructions    Increase exercise/ walking as able    Advise getting a finger monitor to check oxygen    Continue same medications    We will send you lab results          Follow-ups after your visit        Your next 10 appointments already scheduled     Nov 15, 2018  2:15 PM CST   Return Visit with Clay Amos MD   [facility]    [address] [facility] [address] [phone]              Who to contact     If you have questions or need follow up information about today's clinic visit or your schedule please contact [facility] directly at [phone].  Normal or non-critical lab and imaging results will be communicated to you by MyChart, letter or phone within 4 business days after the clinic has received the results. If you do not hear from us within 7 days, please contact the clinic through MyChart or phone. If you have a critical or abnormal lab result, we will notify you by phone as soon as possible.  Submit refill requests through Bill Me Later or call your pharmacy and they will forward the refill request to us. Please allow 3 business days for your refill to be completed.          Additional Information About Your Visit        MyChart Information     Bill Me Later lets you send messages to your doctor, view your test results, renew your prescriptions, schedule appointments and more. To sign up, go to  [email or web address] . Click on \"Log in\" on the left side of the screen, which will take you to the Welcome page. Then click on \"Sign up Now\" on the right side of the page.     You will be asked to enter the access code listed below, as well as some personal information. Please follow the directions to create your username and password.     Your access code is: [email or web address]  Expires: 1/10/2019  2:00 PM     Your access code will  in 90 days. If you need help or a new code, please call your [place] clinic or [phone].        Care EveryWhere ID     This is your Care EveryWhere ID. This could be used by other organizations to access your [place] medical records  [id number]        Your Vitals Were     Pulse Temperature Height Pulse Oximetry Breastfeeding? BMI (Body Mass Index)    75 97  F (36.1  C) (Oral) 5' (1.524 m) 93% No 21.09 kg/m2       Blood Pressure from Last 3 Encounters:   10/22/18 120/73   17 155/69   17 118/65    Weight from Last 3 Encounters:   10/22/18 108 lb (49 kg)   17 106 lb (48.1 kg)   17 105 lb 8 oz (47.9 kg)              We Performed the Following     CBC with platelets differential     Comprehensive metabolic panel     COPD ACTION PLAN     TSH with free T4 reflex          Where to get your medicines      These medications were sent to [facility] - [address]  [address], [place] [address]     Phone:  [phone]     albuterol (2.5 MG/3ML) 0.083% neb solution    albuterol 108 (90 Base) MCG/ACT inhaler    atenolol 25 MG tablet    budesonide-formoterol 160-4.5 MCG/ACT Inhaler    simvastatin 20 MG tablet    tiotropium 18 MCG capsule          Primary Care Provider Office Phone # Fax #    Jose Doyle [phone] [address] [facility] [address]        Equal Access to Services     NANCY POLLOCK AH: Nathan Hernandez, nilda cooper, william watson " ann garrene lazaro'aan ah. So [facility] [phone].    ATENCIÓN: Si habla jese, tiene a molina disposición servicios gratuitos de asistencia lingüística. Jonh al [phone].    We comply with applicable federal civil rights laws and [place] laws. We do not discriminate on the basis of race, color, national origin, age, disability, sex, sexual orientation, or gender identity.            Thank you!     Thank you for choosing [facility]  for your care. Our goal is always to provide you with excellent care. Hearing back from our patients is one way we can continue to improve our services. Please take a few minutes to complete the written survey that you may receive in the mail after your visit with us. Thank you!             Your Updated Medication List - Protect others around you: Learn how to safely use, store and throw away your medicines at www.disposemymeds.org.          This list is accurate as of 10/22/18  2:22 PM.  Always use your most recent med list.                   Brand Name Dispense Instructions for use Diagnosis    * albuterol (2.5 MG/3ML) 0.083% neb solution     1 Box    Take 1 vial (2.5 mg) by nebulization every 4 hours as needed for shortness of breath / dyspnea    Chronic obstructive pulmonary disease, unspecified COPD type (H)       * albuterol 108 (90 Base) MCG/ACT inhaler    PROAIR HFA    3 Inhaler    INHALE TWO PUFFS BY MOUTH EVERY 6 HOURS AS NEEDED FOR  SHORTNESS  OF  BREATH  /  DYSPNEA    Chronic obstructive pulmonary disease, unspecified COPD type (H)       aspirin 81 MG EC tablet     40 tablet    Take 1 tablet (81 mg) by mouth daily        atenolol 25 MG tablet    TENORMIN    90 tablet    TAKE TWO TABLETS BY MOUTH ONCE DAILY**NEEDS TO BE SEEN IN CLINIC**    Essential hypertension with goal blood pressure less than 140/90       budesonide-formoterol 160-4.5 MCG/ACT Inhaler    SYMBICORT    1 Inhaler    INHALE TWO PUFFS BY MOUTH TWICE DAILY     Chronic obstructive pulmonary disease, unspecified COPD type (H)       dorzolamide 2 % ophthalmic solution    TRUSOPT    3 Bottle    Place 1 drop into both eyes 2 times daily    Pseudoexfoliation glaucoma, moderate stage       latanoprost 0.005 % ophthalmic solution    XALATAN    7.5 mL    Place 1 drop into both eyes At Bedtime    Pseudoexfoliation glaucoma, moderate stage       MULTI VIT/FL PO      1 TABLET DAILY        order for DME     1 each    Equipment being ordered: Oxygen.  Oxygen to be titrated per RT to keep SaO2 > 90%. Enroll patient in COPD program for continued follow up. 12/31/12 SaO2 90% room air rest, 76% room air with activity, 88% 2lpm continuous flow with activity.    COPD (chronic obstructive pulmonary disease) (H)       order for DME     1 Device    Equipment being ordered: Vision Air machine and all associated supplies  Patient is on home oxygen    COPD (chronic obstructive pulmonary disease) (H)       simvastatin 20 MG tablet    ZOCOR    90 tablet    TAKE ONE TABLET BY MOUTH ONCE DAILY AT BEDTIME.    Hyperlipidemia LDL goal <130       tiotropium 18 MCG capsule    SPIRIVA HANDIHALER    90 capsule    INHALE ONE DOSE BY MOUTH ONCE DAILY    Chronic obstructive pulmonary disease, unspecified COPD type (H)       vitamin D 2000 units Caps      Take  by mouth daily.        * Notice:  This list has 2 medication(s) that are the same as other medications prescribed for you. Read the directions carefully, and ask your doctor or other care provider to review them with you.

## 2018-10-23 ASSESSMENT — PATIENT HEALTH QUESTIONNAIRE - PHQ9: SUM OF ALL RESPONSES TO PHQ QUESTIONS 1-9: 8

## 2018-10-23 NOTE — PROGRESS NOTES
The albumin ( a measure of protein in your body) is a little low.  Make sure you get enough protein in your diet.    Other labs are stable.    Jose Doyle MD

## 2018-10-24 DIAGNOSIS — I10 ESSENTIAL HYPERTENSION WITH GOAL BLOOD PRESSURE LESS THAN 140/90: ICD-10-CM

## 2018-10-24 RX ORDER — ATENOLOL 100 MG/1
TABLET ORAL
Qty: 45 TABLET | Refills: 3 | OUTPATIENT
Start: 2018-10-24

## 2018-10-24 NOTE — TELEPHONE ENCOUNTER
"Requested Prescriptions   Pending Prescriptions Disp Refills     atenolol (TENORMIN) 100 MG tablet [Pharmacy Med Name: ATENOLOL 100MG      TAB] 45 tablet 3    Last Written Prescription Date:  10/22/18  Last Fill Quantity: 90,  # refills: 3   Last office visit: 10/22/2018 with prescribing provider:     Future Office Visit:   Next 5 appointments (look out 90 days)     Nov 15, 2018  2:15 PM CST   Return Visit with Clay Amos MD   [facility] ([address] [address]   [phone]                  Sig: TAKE ONE-HALF TABLET (50MG) BY MOUTH ONCE DAILY    Beta-Blockers Protocol Passed    10/24/2018  1:14 PM       Passed - Blood pressure under 140/90 in past 12 months    BP Readings from Last 3 Encounters:   10/22/18 120/73   09/21/17 155/69   09/12/17 118/65                Passed - Patient is age 6 or older       Passed - Recent (12 mo) or future (30 days) visit within the authorizing provider's specialty    Patient had office visit in the last 12 months or has a visit in the next 30 days with authorizing provider or within the authorizing provider's specialty.  See \"Patient Info\" tab in inbasket, or \"Choose Columns\" in Meds & Orders section of the refill encounter.                "

## 2018-11-09 DIAGNOSIS — J44.9 CHRONIC OBSTRUCTIVE PULMONARY DISEASE, UNSPECIFIED COPD TYPE (H): ICD-10-CM

## 2018-11-09 RX ORDER — BUDESONIDE AND FORMOTEROL FUMARATE DIHYDRATE 160; 4.5 UG/1; UG/1
AEROSOL RESPIRATORY (INHALATION)
Qty: 11 INHALER | Refills: 1 | Status: SHIPPED | OUTPATIENT
Start: 2018-11-09 | End: 2019-06-03

## 2018-11-09 NOTE — TELEPHONE ENCOUNTER
"Requested Prescriptions   Pending Prescriptions Disp Refills     SYMBICORT 160-4.5 MCG/ACT Inhaler [Pharmacy Med Name: SYMBICORT 160-4.5   AER]  11     Sig: INHALE TWO PUFFS BY MOUTH TWICE DAILY    Inhaled Steroids Protocol Passed    11/9/2018  5:30 AM       Passed - Patient is age 12 or older       Passed - Recent (12 mo) or future (30 days) visit within the authorizing provider's specialty    Patient had office visit in the last 12 months or has a visit in the next 30 days with authorizing provider or within the authorizing provider's specialty.  See \"Patient Info\" tab in inbasket, or \"Choose Columns\" in Meds & Orders section of the refill encounter.              Prescription approved per [facility] Refill Protocol.  Sumi Ferrera RN      "

## 2018-11-15 ENCOUNTER — OFFICE VISIT (OUTPATIENT)
Dept: OPHTHALMOLOGY | Facility: CLINIC | Age: 83
End: 2018-11-15
Payer: COMMERCIAL

## 2018-11-15 DIAGNOSIS — H34.231 RETINAL ARTERY BRANCH OCCLUSION OF RIGHT EYE: ICD-10-CM

## 2018-11-15 DIAGNOSIS — H40.1492 PSEUDOEXFOLIATION GLAUCOMA, MODERATE STAGE: Primary | ICD-10-CM

## 2018-11-15 PROCEDURE — 92083 EXTENDED VISUAL FIELD XM: CPT | Performed by: OPHTHALMOLOGY

## 2018-11-15 PROCEDURE — 92133 CPTRZD OPH DX IMG PST SGM ON: CPT | Performed by: OPHTHALMOLOGY

## 2018-11-15 PROCEDURE — 92012 INTRM OPH EXAM EST PATIENT: CPT | Performed by: OPHTHALMOLOGY

## 2018-11-15 ASSESSMENT — EXTERNAL EXAM - LEFT EYE: OS_EXAM: NORMAL

## 2018-11-15 ASSESSMENT — SLIT LAMP EXAM - LIDS: COMMENTS: 1+ DERMATOCHALASIS - UPPER LID

## 2018-11-15 ASSESSMENT — VISUAL ACUITY
METHOD: SNELLEN - LINEAR
OS_SC+: -1
OS_SC: 20/30
OD_SC+: -2
OD_SC: 20/30

## 2018-11-15 ASSESSMENT — TONOMETRY
IOP_METHOD: ICARE
OS_IOP_MMHG: 10
OD_IOP_MMHG: 18

## 2018-11-15 ASSESSMENT — EXTERNAL EXAM - RIGHT EYE: OD_EXAM: NORMAL

## 2018-11-15 NOTE — MR AVS SNAPSHOT
After Visit Summary   11/15/2018    Jeb Palomino    MRN: [id number]           Patient Information     Date Of Birth          1935        Visit Information        Provider Department      11/15/2018 2:15 PM Clay Amos MD [facility]        Today's Diagnoses     Pseudoexfoliation glaucoma, moderate stage    -  1      Care Instructions    Continue using Dorzolamide both eyes twice daily (about 12 hours apart),  And Latanoprost both eyes at bedtime.   Wait at least 5 minutes between drops if using more than one at a time.   Return visit for SLT Laser at [facility] as scheduled - Wednesday, November 28th at 1:30pm    Clay Amos M.D.  [phone]            Follow-ups after your visit        Who to contact     If you have questions or need follow up information about today's clinic visit or your schedule please contact [facility] directly at [phone].  Normal or non-critical lab and imaging results will be communicated to you by MyChart, letter or phone within 4 business days after the clinic has received the results. If you do not hear from us within 7 days, please contact the clinic through [facility] or phone. If you have a critical or abnormal lab result, we will notify you by phone as soon as possible.  Submit refill requests through [employer] or call your pharmacy and they will forward the refill request to us. Please allow 3 business days for your refill to be completed.          Additional Information About Your Visit        Care EveryWhere ID     This is your Care EveryWhere ID. This could be used by other organizations to access your [place] medical records  [id number]         Blood Pressure from Last 3 Encounters:   10/22/18 120/73   09/21/17 155/69   09/12/17 118/65    Weight from Last 3 Encounters:   10/22/18 49 kg (108 lb)   09/21/17 48.1 kg (106 lb)   09/12/17 47.9 kg (105 lb 8 oz)              Today, you had the following     No  orders found for display       Primary Care Provider Office Phone # Fax #    Jose Doyle [phone]       [address] [address]        Equal Access to Services     NANCY POLLOCK : Hadii ling ku ruchio Sosabraali, waaxda luqadaha, qaybta kaalmada rin, ann snowmagdalena best. So [facility] [phone].    ATENCIÓN: Si habla español, tiene a molina disposición servicios gratuitos de asistencia lingüística. Llame al [phone].    We comply with applicable federal civil rights laws and [place] laws. We do not discriminate on the basis of race, color, national origin, age, disability, sex, sexual orientation, or gender identity.            Thank you!     Thank you for choosing [facility]  for your care. Our goal is always to provide you with excellent care. Hearing back from our patients is one way we can continue to improve our services. Please take a few minutes to complete the written survey that you may receive in the mail after your visit with us. Thank you!             Your Updated Medication List - Protect others around you: Learn how to safely use, store and throw away your medicines at www.disposemymeds.org.          This list is accurate as of 11/15/18  3:51 PM.  Always use your most recent med list.                   Brand Name Dispense Instructions for use Diagnosis    * albuterol (2.5 MG/3ML) 0.083% neb solution     1 Box    Take 1 vial (2.5 mg) by nebulization every 4 hours as needed for shortness of breath / dyspnea    Chronic obstructive pulmonary disease, unspecified COPD type (H)       * albuterol 108 (90 Base) MCG/ACT inhaler    PROAIR HFA    3 Inhaler    INHALE TWO PUFFS BY MOUTH EVERY 6 HOURS AS NEEDED FOR  SHORTNESS  OF  BREATH  /  DYSPNEA    Chronic obstructive pulmonary disease, unspecified COPD type (H)       aspirin 81 MG EC tablet     40 tablet    Take 1 tablet (81 mg) by mouth daily        atenolol 25 MG tablet     TENORMIN    90 tablet    TAKE TWO TABLETS BY MOUTH ONCE DAILY**NEEDS TO BE SEEN IN CLINIC**    Essential hypertension with goal blood pressure less than 140/90       * budesonide-formoterol 160-4.5 MCG/ACT Inhaler    SYMBICORT    1 Inhaler    INHALE TWO PUFFS BY MOUTH TWICE DAILY    Chronic obstructive pulmonary disease, unspecified COPD type (H)       * SYMBICORT 160-4.5 MCG/ACT Inhaler   Generic drug:  budesonide-formoterol     11 Inhaler    INHALE TWO PUFFS BY MOUTH TWICE DAILY    Chronic obstructive pulmonary disease, unspecified COPD type (H)       * SYMBICORT 160-4.5 MCG/ACT Inhaler   Generic drug:  budesonide-formoterol     3 Inhaler    INHALE TWO PUFFS BY MOUTH TWICE DAILY    Chronic obstructive pulmonary disease, unspecified COPD type (H)       dorzolamide 2 % ophthalmic solution    TRUSOPT    3 Bottle    Place 1 drop into both eyes 2 times daily    Pseudoexfoliation glaucoma, moderate stage       latanoprost 0.005 % ophthalmic solution    XALATAN    7.5 mL    Place 1 drop into both eyes At Bedtime    Pseudoexfoliation glaucoma, moderate stage       MULTI VIT/FL PO      1 TABLET DAILY        order for DME     1 each    Equipment being ordered: Oxygen.  Oxygen to be titrated per RT to keep SaO2 > 90%. Enroll patient in COPD program for continued follow up. 12/31/12 SaO2 90% room air rest, 76% room air with activity, 88% 2lpm continuous flow with activity.    COPD (chronic obstructive pulmonary disease) (H)       order for DME     1 Device    Equipment being ordered: Vision Air machine and all associated supplies  Patient is on home oxygen    COPD (chronic obstructive pulmonary disease) (H)       simvastatin 20 MG tablet    ZOCOR    90 tablet    TAKE ONE TABLET BY MOUTH ONCE DAILY AT BEDTIME.    Hyperlipidemia LDL goal <130       tiotropium 18 MCG capsule    SPIRIVA HANDIHALER    90 capsule    INHALE ONE DOSE BY MOUTH ONCE DAILY    Chronic obstructive pulmonary disease, unspecified COPD type (H)       vitamin  D 2000 units Caps      Take  by mouth daily.        * Notice:  This list has 5 medication(s) that are the same as other medications prescribed for you. Read the directions carefully, and ask your doctor or other care provider to review them with you.

## 2018-11-15 NOTE — PATIENT INSTRUCTIONS
Continue using Dorzolamide both eyes twice daily (about 12 hours apart),  And Latanoprost both eyes at bedtime.   Wait at least 5 minutes between drops if using more than one at a time.   Return visit for [facility] Laser at [facility] as scheduled - Wednesday, November 28th at 1:30pm    Clay Amos M.D.  [phone]

## 2018-11-15 NOTE — LETTER
11/15/2018         RE: Jeb Palomino  [address] [address]        Dear Colleague,    Thank you for referring your patient, Jeb Palomino, to the [facility]. Please see a copy of my visit note below.     Current Eye Medications:  Dorzolamide both eyes twice a day, Latanoprost both eyes every evening.  Last drops: this morning, but doesn't recall the exact time.     Subjective:  Follow up Pseudoexfoliation Glaucoma.  Patient is here for a pressure check, OCT, and visual field.  No vision changes or concerns.     Objective:  See Ophthalmology Exam.       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  Glaucoma OCT and Andres Visual Field show worsening both eyes.      Plan:  Will proceed with selective laser trabeculoplasty left eye first; probably right eye thereafter.    Continue using Dorzolamide both eyes twice daily (about 12 hours apart),  And Latanoprost both eyes at bedtime.   Wait at least 5 minutes between drops if using more than one at a time.   Return visit for [facility]T Laser at [facility] as scheduled - Wednesday, November 28th at 1:30pm    Clay Amos M.D.  [phone]                     Again, thank you for allowing me to participate in the care of your patient.        Sincerely,        Clay Amos MD

## 2018-11-16 ENCOUNTER — TELEPHONE (OUTPATIENT)
Dept: OPHTHALMOLOGY | Facility: CLINIC | Age: 83
End: 2018-11-16

## 2018-11-16 NOTE — TELEPHONE ENCOUNTER
Type of surgery: Selective Last Trabeculoplasty Left Eye  CPT   Location of surgery: Other: [facility]  Date and time of surgery: 11/28/2018 @ 1:30pm  Surgeon: Dr. Amos  Pre-Op Appt Date: 11/15/2018  Post-Op Appt Date: 12/18/2018   Packet sent out: Yes  Pre-cert/Authorization completed:  NO pre cert needed  Date: 11/16/2018

## 2018-11-17 ASSESSMENT — PACHYMETRY
OD_CT(UM): 600
OS_CT(UM): 540

## 2018-12-18 ENCOUNTER — OFFICE VISIT (OUTPATIENT)
Dept: OPHTHALMOLOGY | Facility: CLINIC | Age: 83
End: 2018-12-18
Payer: COMMERCIAL

## 2018-12-18 DIAGNOSIS — H40.1492 PSEUDOEXFOLIATION GLAUCOMA, MODERATE STAGE: Primary | ICD-10-CM

## 2018-12-18 PROCEDURE — 92012 INTRM OPH EXAM EST PATIENT: CPT | Performed by: OPHTHALMOLOGY

## 2018-12-18 ASSESSMENT — VISUAL ACUITY
METHOD: SNELLEN - LINEAR
OS_SC: 20/30
OD_SC: 20/30

## 2018-12-18 ASSESSMENT — SLIT LAMP EXAM - LIDS
COMMENTS: 1+ DERMATOCHALASIS - UPPER LID
COMMENTS: 1+ DERMATOCHALASIS - UPPER LID

## 2018-12-18 ASSESSMENT — TONOMETRY
OS_IOP_MMHG: 12
IOP_METHOD: APPLANATION
OD_IOP_MMHG: 15

## 2018-12-18 ASSESSMENT — EXTERNAL EXAM - LEFT EYE: OS_EXAM: NORMAL

## 2018-12-18 ASSESSMENT — EXTERNAL EXAM - RIGHT EYE: OD_EXAM: NORMAL

## 2018-12-18 NOTE — PATIENT INSTRUCTIONS
Continue using Dorzolamide both eyes at bedtime,  And Latanoprost both eyes at bedtime.   Return visit in 1 month for intraocular pressure check.    Clay Amos M.D.  [phone]

## 2018-12-18 NOTE — PROGRESS NOTES
Current Eye Medications:  Latanoprost nightly both eyes and dorzolamide nightly both eyes.     Subjective:  1 wk post  slt iop left eye .   Pt reports she is seeing good and her eyes seem fine.     Objective:  See Ophthalmology Exam.       Assessment:  Intraocular pressure stable after selective laser trabeculoplasty left eye.      Plan:  Continue using Dorzolamide both eyes at bedtime,  And Latanoprost both eyes at bedtime.   Return visit in 1 month for intraocular pressure check.    Clay Amos M.D.  [phone]

## 2018-12-18 NOTE — LETTER
12/18/2018         RE: Jeb Palomino  [address] [address]        Dear Colleague,    Thank you for referring your patient, Jeb Palomino, to the [facility]. Please see a copy of my visit note below.     Current Eye Medications:  Latanoprost nightly both eyes and dorzolamide nightly both eyes.     Subjective:  1 wk post  slt iop left eye .   Pt reports she is seeing good and her eyes seem fine.     Objective:  See Ophthalmology Exam.       Assessment:  Intraocular pressure stable after selective laser trabeculoplasty left eye.      Plan:  Continue using Dorzolamide both eyes at bedtime,  And Latanoprost both eyes at bedtime.   Return visit in 1 month for intraocular pressure check.    Clay Amos M.D.  [phone]           Again, thank you for allowing me to participate in the care of your patient.        Sincerely,        Clay Amos MD

## 2018-12-28 ENCOUNTER — DOCUMENTATION ONLY (OUTPATIENT)
Dept: OPHTHALMOLOGY | Facility: CLINIC | Age: 83
End: 2018-12-28

## 2019-01-01 ENCOUNTER — TELEPHONE (OUTPATIENT)
Dept: FAMILY MEDICINE | Facility: CLINIC | Age: 84
End: 2019-01-01

## 2019-01-01 ENCOUNTER — OFFICE VISIT (OUTPATIENT)
Dept: FAMILY MEDICINE | Facility: CLINIC | Age: 84
End: 2019-01-01
Payer: COMMERCIAL

## 2019-01-01 ENCOUNTER — TELEPHONE (OUTPATIENT)
Dept: FAMILY MEDICINE | Facility: CLINIC | Age: 84
End: 2019-01-01
Payer: COMMERCIAL

## 2019-01-01 VITALS
SYSTOLIC BLOOD PRESSURE: 118 MMHG | BODY MASS INDEX: 20.62 KG/M2 | HEART RATE: 76 BPM | OXYGEN SATURATION: 93 % | WEIGHT: 105 LBS | DIASTOLIC BLOOD PRESSURE: 74 MMHG | HEIGHT: 60 IN | TEMPERATURE: 97.5 F

## 2019-01-01 DIAGNOSIS — E78.5 HYPERLIPIDEMIA LDL GOAL <130: ICD-10-CM

## 2019-01-01 DIAGNOSIS — Z00.00 HEALTH CARE MAINTENANCE: Primary | ICD-10-CM

## 2019-01-01 DIAGNOSIS — I10 ESSENTIAL HYPERTENSION WITH GOAL BLOOD PRESSURE LESS THAN 140/90: ICD-10-CM

## 2019-01-01 DIAGNOSIS — J44.9 CHRONIC OBSTRUCTIVE PULMONARY DISEASE, UNSPECIFIED COPD TYPE (H): ICD-10-CM

## 2019-01-01 DIAGNOSIS — Z23 NEED FOR PROPHYLACTIC VACCINATION AND INOCULATION AGAINST INFLUENZA: ICD-10-CM

## 2019-01-01 DIAGNOSIS — I65.29 STENOSIS OF CAROTID ARTERY, UNSPECIFIED LATERALITY: ICD-10-CM

## 2019-01-01 DIAGNOSIS — H40.1492 PSEUDOEXFOLIATION GLAUCOMA, MODERATE STAGE: ICD-10-CM

## 2019-01-01 DIAGNOSIS — J44.9 CHRONIC OBSTRUCTIVE PULMONARY DISEASE, UNSPECIFIED COPD TYPE (H): Primary | ICD-10-CM

## 2019-01-01 DIAGNOSIS — I10 HYPERTENSION GOAL BP (BLOOD PRESSURE) < 140/90: ICD-10-CM

## 2019-01-01 PROCEDURE — 99214 OFFICE O/P EST MOD 30 MIN: CPT | Mod: 25 | Performed by: FAMILY MEDICINE

## 2019-01-01 PROCEDURE — 99207 C PAF COMPLETED  NO CHARGE: CPT | Performed by: FAMILY MEDICINE

## 2019-01-01 PROCEDURE — 90662 IIV NO PRSV INCREASED AG IM: CPT | Performed by: FAMILY MEDICINE

## 2019-01-01 PROCEDURE — G0008 ADMIN INFLUENZA VIRUS VAC: HCPCS | Performed by: FAMILY MEDICINE

## 2019-01-01 RX ORDER — ATENOLOL 25 MG/1
50 TABLET ORAL DAILY
Qty: 180 TABLET | Refills: 0 | Status: SHIPPED | OUTPATIENT
Start: 2019-01-01 | End: 2020-01-01

## 2019-01-01 RX ORDER — TIOTROPIUM BROMIDE 18 UG/1
CAPSULE ORAL; RESPIRATORY (INHALATION)
Qty: 90 CAPSULE | Refills: 3 | Status: SHIPPED | OUTPATIENT
Start: 2019-01-01 | End: 2020-01-01

## 2019-01-01 RX ORDER — LATANOPROST 50 UG/ML
1 SOLUTION/ DROPS OPHTHALMIC AT BEDTIME
Qty: 2.5 ML | Refills: 2 | Status: SHIPPED | OUTPATIENT
Start: 2019-01-01 | End: 2020-01-01

## 2019-01-01 ASSESSMENT — MIFFLIN-ST. JEOR: SCORE: 852.78

## 2019-01-18 ENCOUNTER — OFFICE VISIT (OUTPATIENT)
Dept: OPHTHALMOLOGY | Facility: CLINIC | Age: 84
End: 2019-01-18
Payer: COMMERCIAL

## 2019-01-18 DIAGNOSIS — H40.1492 PSEUDOEXFOLIATION GLAUCOMA, MODERATE STAGE: Primary | ICD-10-CM

## 2019-01-18 PROCEDURE — 92012 INTRM OPH EXAM EST PATIENT: CPT | Performed by: OPHTHALMOLOGY

## 2019-01-18 ASSESSMENT — TONOMETRY
OD_IOP_MMHG: 14
IOP_METHOD: APPLANATION
OS_IOP_MMHG: 11

## 2019-01-18 ASSESSMENT — SLIT LAMP EXAM - LIDS
COMMENTS: 1+ DERMATOCHALASIS - UPPER LID
COMMENTS: 1+ DERMATOCHALASIS - UPPER LID

## 2019-01-18 ASSESSMENT — VISUAL ACUITY
METHOD: SNELLEN - LINEAR
OD_CC: 20/25
CORRECTION_TYPE: GLASSES
OS_CC: 20/40

## 2019-01-18 ASSESSMENT — EXTERNAL EXAM - RIGHT EYE: OD_EXAM: NORMAL

## 2019-01-18 ASSESSMENT — EXTERNAL EXAM - LEFT EYE: OS_EXAM: NORMAL

## 2019-01-18 NOTE — PATIENT INSTRUCTIONS
Continue using Latanoprost (green top) both eyes at bedtime,  And Dorzolamide both eyes twice daily (aboue 12 hours apart).  Wait at least 5 minutes between drops if using more than one at a time.  Return visit in 3 months for intraocular pressure check.     Clay Amos M.D.  [phone]

## 2019-01-18 NOTE — PROGRESS NOTES
Current Eye Medications:  Latanoprost every evening both eyes (9d:30pm), Dorzolamide bid both eyes (12pm)     Subjective:  TA     Doing well.  No complaints.     Objective:  See Ophthalmology Exam.       Assessment:  Intraocular pressure remains improved left eye after selective laser trabeculoplasty.      Plan:  Offered selective laser trabeculoplasty right eye; patient desires to hold for now. Will consider in future.    Continue using Latanoprost (green top) both eyes at bedtime,  And Dorzolamide both eyes twice daily (aboue 12 hours apart).  Wait at least 5 minutes between drops if using more than one at a time.  Return visit in 3 months for intraocular pressure check.     Clay Amos M.D.  [phone]

## 2019-01-18 NOTE — LETTER
1/18/2019         RE: Jeb Palomino  [address] [address]        Dear Colleague,    Thank you for referring your patient, Jeb Palomino, to the [facility]. Please see a copy of my visit note below.     Current Eye Medications:  Latanoprost every evening both eyes (9d:30pm), Dorzolamide bid both eyes (12pm)     Subjective:  TA     Doing well.  No complaints.     Objective:  See Ophthalmology Exam.       Assessment:  Intraocular pressure remains improved left eye after selective laser trabeculoplasty.      Plan:  Offered selective laser trabeculoplasty right eye; patient desires to hold for now. Will consider in future.    Continue using Latanoprost (green top) both eyes at bedtime,  And Dorzolamide both eyes twice daily (aboue 12 hours apart).  Wait at least 5 minutes between drops if using more than one at a time.  Return visit in 3 months for intraocular pressure check.     Clay Amos M.D.  [phone]         Again, thank you for allowing me to participate in the care of your patient.        Sincerely,        Clay Amos MD

## 2019-03-05 ENCOUNTER — TELEPHONE (OUTPATIENT)
Dept: FAMILY MEDICINE | Facility: CLINIC | Age: 84
End: 2019-03-05

## 2019-03-05 NOTE — TELEPHONE ENCOUNTER
Forms received from: [employer]   Phone number listed: [phone]   Fax listed: [phone]  Date received: 03/05/19  Form description: Oxygen renewel  Once forms are completed, please return to [employer] via Fax.  Is patient requesting to be contacted when forms are completed: NA   Form placed: in providers graciela Gorman

## 2019-03-06 NOTE — TELEPHONE ENCOUNTER
Requested information faxed to number provided.  [facility]  Team 3 Coordinator

## 2019-05-10 DIAGNOSIS — I10 ESSENTIAL HYPERTENSION WITH GOAL BLOOD PRESSURE LESS THAN 140/90: ICD-10-CM

## 2019-05-13 RX ORDER — ATENOLOL 25 MG/1
50 TABLET ORAL DAILY
Qty: 180 TABLET | Refills: 1 | Status: SHIPPED | OUTPATIENT
Start: 2019-05-13 | End: 2019-06-03

## 2019-05-13 NOTE — TELEPHONE ENCOUNTER
"Requested Prescriptions   Pending Prescriptions Disp Refills     atenolol (TENORMIN) 25 MG tablet [Pharmacy Med Name: ATENOLOL 25MG       TAB] 90 tablet 3     Sig: TAKE 2 TABLETS BY MOUTH ONCE LILY NEEDS TO BE SEEN IN CLINIC   Last Written Prescription Date:  10/22/18  Last Fill Quantity: 90,  # refills: 3   Last office visit: 10/22/2018 with prescribing provider:     Future Office Visit:   Next 5 appointments (look out 90 days)    May 16, 2019  9:00 AM CDT  PHYSICAL with Jose Doyle MD  [facility] [address] [place] [id number]  [phone]             Beta-Blockers Protocol Passed - 5/10/2019  5:49 PM        Passed - Blood pressure under 140/90 in past 12 months     BP Readings from Last 3 Encounters:   10/22/18 120/73   09/21/17 155/69   09/12/17 118/65                 Passed - Patient is age 6 or older        Passed - Recent (12 mo) or future (30 days) visit within the authorizing provider's specialty     Patient had office visit in the last 12 months or has a visit in the next 30 days with authorizing provider or within the authorizing provider's specialty.  See \"Patient Info\" tab in inbasket, or \"Choose Columns\" in Meds & Orders section of the refill encounter.              Passed - Medication is active on med list          "

## 2019-06-03 ENCOUNTER — OFFICE VISIT (OUTPATIENT)
Dept: FAMILY MEDICINE | Facility: CLINIC | Age: 84
End: 2019-06-03
Payer: COMMERCIAL

## 2019-06-03 VITALS
SYSTOLIC BLOOD PRESSURE: 138 MMHG | HEART RATE: 109 BPM | WEIGHT: 105 LBS | HEIGHT: 60 IN | OXYGEN SATURATION: 94 % | DIASTOLIC BLOOD PRESSURE: 78 MMHG | BODY MASS INDEX: 20.62 KG/M2 | TEMPERATURE: 98 F

## 2019-06-03 DIAGNOSIS — R53.83 FATIGUE, UNSPECIFIED TYPE: ICD-10-CM

## 2019-06-03 DIAGNOSIS — E78.5 HYPERLIPIDEMIA LDL GOAL <130: ICD-10-CM

## 2019-06-03 DIAGNOSIS — R73.01 IMPAIRED FASTING GLUCOSE: ICD-10-CM

## 2019-06-03 DIAGNOSIS — I10 ESSENTIAL HYPERTENSION WITH GOAL BLOOD PRESSURE LESS THAN 140/90: ICD-10-CM

## 2019-06-03 DIAGNOSIS — J44.9 CHRONIC OBSTRUCTIVE PULMONARY DISEASE, UNSPECIFIED COPD TYPE (H): ICD-10-CM

## 2019-06-03 DIAGNOSIS — Z00.00 ENCOUNTER FOR PREVENTATIVE ADULT HEALTH CARE EXAMINATION: Primary | ICD-10-CM

## 2019-06-03 LAB
BASOPHILS # BLD AUTO: 0 10E9/L (ref 0–0.2)
BASOPHILS NFR BLD AUTO: 0.6 %
DIFFERENTIAL METHOD BLD: NORMAL
EOSINOPHIL # BLD AUTO: 0.2 10E9/L (ref 0–0.7)
EOSINOPHIL NFR BLD AUTO: 2.4 %
ERYTHROCYTE [DISTWIDTH] IN BLOOD BY AUTOMATED COUNT: 12.9 % (ref 10–15)
HBA1C MFR BLD: 4.7 % (ref 0–5.6)
HCT VFR BLD AUTO: 37.8 % (ref 35–47)
HGB BLD-MCNC: 12.7 G/DL (ref 11.7–15.7)
LYMPHOCYTES # BLD AUTO: 1.3 10E9/L (ref 0.8–5.3)
LYMPHOCYTES NFR BLD AUTO: 17.5 %
MCH RBC QN AUTO: 30.9 PG (ref 26.5–33)
MCHC RBC AUTO-ENTMCNC: 33.6 G/DL (ref 31.5–36.5)
MCV RBC AUTO: 92 FL (ref 78–100)
MONOCYTES # BLD AUTO: 0.6 10E9/L (ref 0–1.3)
MONOCYTES NFR BLD AUTO: 7.7 %
NEUTROPHILS # BLD AUTO: 5.2 10E9/L (ref 1.6–8.3)
NEUTROPHILS NFR BLD AUTO: 71.8 %
PLATELET # BLD AUTO: 217 10E9/L (ref 150–450)
RBC # BLD AUTO: 4.11 10E12/L (ref 3.8–5.2)
WBC # BLD AUTO: 7.2 10E9/L (ref 4–11)

## 2019-06-03 PROCEDURE — 85025 COMPLETE CBC W/AUTO DIFF WBC: CPT | Performed by: FAMILY MEDICINE

## 2019-06-03 PROCEDURE — 99397 PER PM REEVAL EST PAT 65+ YR: CPT | Performed by: FAMILY MEDICINE

## 2019-06-03 PROCEDURE — 84443 ASSAY THYROID STIM HORMONE: CPT | Performed by: FAMILY MEDICINE

## 2019-06-03 PROCEDURE — 83036 HEMOGLOBIN GLYCOSYLATED A1C: CPT | Performed by: FAMILY MEDICINE

## 2019-06-03 PROCEDURE — 80053 COMPREHEN METABOLIC PANEL: CPT | Performed by: FAMILY MEDICINE

## 2019-06-03 PROCEDURE — 36415 COLL VENOUS BLD VENIPUNCTURE: CPT | Performed by: FAMILY MEDICINE

## 2019-06-03 PROCEDURE — 80061 LIPID PANEL: CPT | Performed by: FAMILY MEDICINE

## 2019-06-03 RX ORDER — SIMVASTATIN 20 MG
TABLET ORAL
Qty: 90 TABLET | Refills: 3 | Status: SHIPPED | OUTPATIENT
Start: 2019-06-03 | End: 2020-01-01

## 2019-06-03 RX ORDER — ALBUTEROL SULFATE 90 UG/1
AEROSOL, METERED RESPIRATORY (INHALATION)
Qty: 8.5 G | Refills: 11 | Status: SHIPPED | OUTPATIENT
Start: 2019-06-03

## 2019-06-03 RX ORDER — BUDESONIDE AND FORMOTEROL FUMARATE DIHYDRATE 160; 4.5 UG/1; UG/1
AEROSOL RESPIRATORY (INHALATION)
Qty: 1 INHALER | Refills: 11 | Status: SHIPPED | OUTPATIENT
Start: 2019-06-03 | End: 2020-01-01

## 2019-06-03 RX ORDER — ATENOLOL 25 MG/1
50 TABLET ORAL DAILY
Qty: 180 TABLET | Refills: 3 | Status: SHIPPED | OUTPATIENT
Start: 2019-06-03 | End: 2019-01-01

## 2019-06-03 ASSESSMENT — PAIN SCALES - GENERAL: PAINLEVEL: NO PAIN (0)

## 2019-06-03 ASSESSMENT — MIFFLIN-ST. JEOR: SCORE: 857.78

## 2019-06-03 NOTE — PROGRESS NOTES
"  SUBJECTIVE:   Jeb Palomino is a 83 year old female who presents for Preventive Visit.      Are you in the first 12 months of your Medicare Part B coverage?  No    Physical Health:    In general, how would you rate your overall physical health? fair    Outside of work, how many days during the week do you exercise? none    Outside of work, approximately how many minutes a day do you exercise?not applicable  If you drink alcohol do you typically have >3 drinks per day or >7 drinks per week? Yes - AUDIT SCORE:     No flowsheet data found.    Do you usually eat at least 4 servings of fruit and vegetables a day, include whole grains & fiber and avoid regularly eating high fat or \"junk\" foods? NO    Do you have any problems taking medications regularly?  No    Do you have any side effects from medications? none    Needs assistance for the following daily activities:       Which of the following safety concerns are present in your home?        Hearing impairment: No    In the past 6 months, have you been bothered by leaking of urine? no    Mental Health:    In general, how would you rate your overall mental or emotional health? good  PHQ-2 Score:      Do you feel safe in your environment? Yes    Do you have a Health Care Directive? Yes: Advance Directive has been received and scanned.    Additional concerns to address?  No    Fall risk:  Fallen 2 or more times in the past year?: No  Any fall with injury in the past year?: No    Cognitive Screening: seems appropriate  Good memory per son     Do you have sleep apnea, excessive snoring or daytime drowsiness?: no        none    Reviewed and updated as needed this visit by clinical staff  Tobacco  Allergies  Meds  Med Hx  Surg Hx  Fam Hx  Soc Hx        Reviewed and updated as needed this visit by Provider        Social History     Tobacco Use     Smoking status: Former Smoker     Types: Cigarettes     Last attempt to quit: 2/9/2009     Years since quitting: 10.3     " Smokeless tobacco: Never Used   Substance Use Topics     Alcohol use: Yes     Comment: Glass of wine daily                           Current providers sharing in care for this patient include:   Patient Care Team:  Jose Doyle MD as PCP - General  Radha Santiago as   Jose Doyle MD as Assigned PCP    The following health maintenance items are reviewed in Epic and correct as of today:  Health Maintenance   Topic Date Due     DEXA  1935     ZOSTER IMMUNIZATION (1 of 2) 08/12/1986     MEDICARE ANNUAL WELLNESS VISIT  08/12/2001     FALL RISK ASSESSMENT  09/12/2018     PHQ-2  01/01/2019     DIABETIC EYE EXAM  07/03/2019     BMP  10/22/2019     DTAP/TDAP/TD IMMUNIZATION (2 - Td) 10/14/2021     LIPID  09/13/2022     ADVANCED DIRECTIVE PLANNING  10/22/2023     SPIROMETRY  Completed     COPD ACTION PLAN  Completed     INFLUENZA VACCINE  Completed     PNEUMOCOCCAL IMMUNIZATION 65+ LOW/MEDIUM RISK  Completed     IPV IMMUNIZATION  Aged Out     MENINGITIS IMMUNIZATION  Aged Out     Patient does not have any concerns    Patient and  moved in with son in February       on oxygen most of time, 3 liters often    Can do bathroom stuff on own   Dresses self    Not helping around house    Son and his wife are preparing all meals    Does not get out at all    Just goes with son    Vision okay    ROS:  See above     Occasional nosebleeds    Not using any nasal saline etc    Has the nasal cannula in most of time    OBJECTIVE:   /85 (BP Location: Left arm, Patient Position: Chair, Cuff Size: Adult Small)   Pulse 109   Temp 98  F (36.7  C) (Oral)   Ht 1.524 m (5')   Wt 47.6 kg (105 lb)   SpO2 94%   Breastfeeding? No   BMI 20.51 kg/m   Estimated body mass index is 20.51 kg/m  as calculated from the following:    Height as of this encounter: 1.524 m (5').    Weight as of this encounter: 47.6 kg (105 lb).  EXAM:   GENERAL: healthy, alert and no distress  EYES: Eyes grossly normal to  inspection, PERRL and conjunctivae and sclerae normal  HENT: normal cephalic/atraumatic, oropharynx clear and tympanic membranes only partially seen due to wax.  Nasal and oral mucosa dry.    NECK: no adenopathy, no asymmetry, masses, or scars and thyroid normal to palpation  RESP: distant breath sounds.  CV: regular rate and rhythm, normal S1 S2, no S3 or S4, no murmur, click or rub, no peripheral edema and peripheral pulses strong  ABDOMEN: soft, nontender, no hepatosplenomegaly, no masses and bowel sounds normal  MS: no gross musculoskeletal defects noted, no edema  SKIN: no suspicious lesions or rashes  NEURO: Normal strength and tone, mentation intact and speech normal  PSYCH: mentation appears normal, affect normal/bright    Diagnostic Test Results:  Labs reviewed in Epic    ASSESSMENT / PLAN:   Jeb was seen today for wellness visit and health maintenance.    Diagnoses and all orders for this visit:    Encounter for preventative adult health care examination    Chronic obstructive pulmonary disease, unspecified COPD type (H)  -     albuterol (PROAIR HFA) 108 (90 Base) MCG/ACT inhaler; INHALE TWO PUFFS BY MOUTH EVERY 6 HOURS AS NEEDED FOR  SHORTNESS  OF  BREATH  /  DYSPNEA  -     budesonide-formoterol (SYMBICORT) 160-4.5 MCG/ACT Inhaler; INHALE TWO PUFFS BY MOUTH TWICE DAILY    Essential hypertension with goal blood pressure less than 140/90  -     atenolol (TENORMIN) 25 MG tablet; Take 2 tablets (50 mg) by mouth daily    Hyperlipidemia LDL goal <130  -     simvastatin (ZOCOR) 20 MG tablet; TAKE ONE TABLET BY MOUTH ONCE DAILY AT BEDTIME.  -     Lipid panel reflex to direct LDL Non-fasting  -     Comprehensive metabolic panel    Impaired fasting glucose  -     Hemoglobin A1c    Fatigue, unspecified type  -     TSH with free T4 reflex  -     CBC with platelets differential    patient fairly stable  Needs to increase walking/ activity  Also increase calorie intake  Lungs with severe chronic disease.  Stay on  symbicort.  Have the prn albuterol.  Not using neb at all  Use over the counter nasal saline for dry nasal mucosa  Refill meds  Check labs     End of Life Planning:  Patient currently has an advanced directive: Yes.  Practitioner is supportive of decision.    COUNSELING:  Reviewed preventive health counseling, as reflected in patient instructions       Regular exercise       Healthy diet/nutrition       Vision screening       Dental care    Estimated body mass index is 20.51 kg/m  as calculated from the following:    Height as of this encounter: 1.524 m (5').    Weight as of this encounter: 47.6 kg (105 lb).         reports that she quit smoking about 10 years ago. Her smoking use included cigarettes. She has never used smokeless tobacco.      Appropriate preventive services were discussed with this patient, including applicable screening as appropriate for cardiovascular disease, diabetes, osteopenia/osteoporosis, and glaucoma.  As appropriate for age/gender, discussed screening for colorectal cancer, prostate cancer, breast cancer, and cervical cancer. Checklist reviewing preventive services available has been given to the patient.    Reviewed patients plan of care and provided an AVS. The Basic Care Plan (routine screening as documented in Health Maintenance) for Jeb meets the Care Plan requirement. This Care Plan has been established and reviewed with the Patient.    Counseling Resources:  ATP IV Guidelines  Pooled Cohorts Equation Calculator  Breast Cancer Risk Calculator  FRAX Risk Assessment  ICSI Preventive Guidelines  Dietary Guidelines for Americans, 2010  USDA's MyPlate  ASA Prophylaxis  Lung CA Screening    Jose Doyle MD  [facility]

## 2019-06-03 NOTE — PATIENT INSTRUCTIONS
Stay on same medications    We will send you lab results    Increase calorie intake    Use over the counter nasal saline spray several times daily to keep nose moist    Stay well hydrated/ keep mouth moist    Increase walking/ activity           Patient Education   Personalized Prevention Plan  You are due for the preventive services outlined below.  Your care team is available to assist you in scheduling these services.  If you have already completed any of these items, please share that information with your care team to update in your medical record.  Health Maintenance Due   Topic Date Due     Osteoporosis Screening  1935     Zoster (Shingles) Vaccine (1 of 2) 08/12/1986     Annual Wellness Visit  08/12/2001     FALL RISK ASSESSMENT  09/12/2018     PHQ-2  01/01/2019     Eye Exam  07/03/2019

## 2019-06-03 NOTE — LETTER
[facility]   [address] [place]  [address]  [phone]                                   June 5, 2019    Jeb Palomino  [address] [facility] [place] [address]        Dear Jeb,    These labs are all normal.    Results for orders placed or performed in visit on 06/03/19   Lipid panel reflex to direct LDL Non-fasting   Result Value Ref Range    Cholesterol 137 <200 mg/dL    Triglycerides 107 <150 mg/dL    HDL Cholesterol 70 >49 mg/dL    LDL Cholesterol Calculated 46 <100 mg/dL    Non HDL Cholesterol 67 <130 mg/dL   Comprehensive metabolic panel   Result Value Ref Range    Sodium 134 133 - 144 mmol/L    Potassium 4.4 3.4 - 5.3 mmol/L    Chloride 95 94 - 109 mmol/L    Carbon Dioxide 30 20 - 32 mmol/L    Anion Gap 9 3 - 14 mmol/L    Glucose 91 70 - 99 mg/dL    Urea Nitrogen 7 7 - 30 mg/dL    Creatinine 0.86 0.52 - 1.04 mg/dL    GFR Estimate 63 >60 mL/min/[1.73_m2]    GFR Estimate If Black 73 >60 mL/min/[1.73_m2]    Calcium 10.1 8.5 - 10.1 mg/dL    Bilirubin Total 0.6 0.2 - 1.3 mg/dL    Albumin 3.5 3.4 - 5.0 g/dL    Protein Total 7.7 6.8 - 8.8 g/dL    Alkaline Phosphatase 133 40 - 150 U/L    ALT 14 0 - 50 U/L    AST 21 0 - 45 U/L   TSH with free T4 reflex   Result Value Ref Range    TSH 0.85 0.40 - 4.00 mU/L   CBC with platelets differential   Result Value Ref Range    WBC 7.2 4.0 - 11.0 10e9/L    RBC Count 4.11 3.8 - 5.2 10e12/L    Hemoglobin 12.7 11.7 - 15.7 g/dL    Hematocrit 37.8 35.0 - 47.0 %    MCV 92 78 - 100 fl    MCH 30.9 26.5 - 33.0 pg    MCHC 33.6 31.5 - 36.5 g/dL    RDW 12.9 10.0 - 15.0 %    Platelet Count 217 150 - 450 10e9/L    % Neutrophils 71.8 %    % Lymphocytes 17.5 %    % Monocytes 7.7 %    % Eosinophils 2.4 %    % Basophils 0.6 %    Absolute Neutrophil 5.2 1.6 - 8.3 10e9/L    Absolute Lymphocytes 1.3 0.8 - 5.3 10e9/L    Absolute Monocytes 0.6 0.0 - 1.3 10e9/L    Absolute Eosinophils 0.2 0.0 - 0.7 10e9/L    Absolute Basophils 0.0 0.0 - 0.2 10e9/L     Diff Method Automated Method    Hemoglobin A1c   Result Value Ref Range    Hemoglobin A1C 4.7 0 - 5.6 %       If you have any questions please call the clinic at [phone]    Sincerely,    Jose Doyle MD  [facility]

## 2019-06-04 LAB
ALBUMIN SERPL-MCNC: 3.5 G/DL (ref 3.4–5)
ALP SERPL-CCNC: 133 U/L (ref 40–150)
ALT SERPL W P-5'-P-CCNC: 14 U/L (ref 0–50)
ANION GAP SERPL CALCULATED.3IONS-SCNC: 9 MMOL/L (ref 3–14)
AST SERPL W P-5'-P-CCNC: 21 U/L (ref 0–45)
BILIRUB SERPL-MCNC: 0.6 MG/DL (ref 0.2–1.3)
BUN SERPL-MCNC: 7 MG/DL (ref 7–30)
CALCIUM SERPL-MCNC: 10.1 MG/DL (ref 8.5–10.1)
CHLORIDE SERPL-SCNC: 95 MMOL/L (ref 94–109)
CHOLEST SERPL-MCNC: 137 MG/DL
CO2 SERPL-SCNC: 30 MMOL/L (ref 20–32)
CREAT SERPL-MCNC: 0.86 MG/DL (ref 0.52–1.04)
GFR SERPL CREATININE-BSD FRML MDRD: 63 ML/MIN/{1.73_M2}
GLUCOSE SERPL-MCNC: 91 MG/DL (ref 70–99)
HDLC SERPL-MCNC: 70 MG/DL
LDLC SERPL CALC-MCNC: 46 MG/DL
NONHDLC SERPL-MCNC: 67 MG/DL
POTASSIUM SERPL-SCNC: 4.4 MMOL/L (ref 3.4–5.3)
PROT SERPL-MCNC: 7.7 G/DL (ref 6.8–8.8)
SODIUM SERPL-SCNC: 134 MMOL/L (ref 133–144)
TRIGL SERPL-MCNC: 107 MG/DL
TSH SERPL DL<=0.005 MIU/L-ACNC: 0.85 MU/L (ref 0.4–4)

## 2019-06-07 ENCOUNTER — PATIENT OUTREACH (OUTPATIENT)
Dept: CARE COORDINATION | Facility: CLINIC | Age: 84
End: 2019-06-07

## 2019-06-07 DIAGNOSIS — J44.9 CHRONIC OBSTRUCTIVE PULMONARY DISEASE, UNSPECIFIED COPD TYPE (H): Primary | ICD-10-CM

## 2019-06-07 NOTE — PROGRESS NOTES
Clinic Care Coordination Contact--Social Work Initial Call   [facility]/Voicemail    Clinical Data: Care Coordinator Outreach.  Patient's daughter Lidia left message for SW requesting return call.  She is questioning about home care and resources for Patient.      Outreach attempted x 1.  Call to Lidia's cell phone, the voice mail is full  Plan: Care Coordinator will try to reach patient again in 1-2 business days.    DANIEL Hernandez, REYES   [facility]   [phone]  6/7/2019 5:29 PM    Clinic Care Coordination Contact--Social Work Initial Call     Clinical Data: Care Coordinator Outreach.  Patient's daughter Lidia left message for SW requesting return call.  She is questioning about home care and resources for Patient, specific concerns regarding memory function.  JULIETH noted in chart that Patient's son Carlos is the main .  SW called him, informed she could not reach Lidia (whom he states is his wife).  SW provided her contact information, Carlos will have Lidia call SW as states she is much better at these things.      Plan: Care Coordinator will call Patient's daughter in law at end of the week if no return call     DANIEL Hernandez, REYES   [facility]   [phone]  6/7/2019 5:29 PM    There has not been return response from daughter in law regarding above, letter also sent with SW's contact information.  JULIETH will close to Care Coordination at this time     DANIEL Hernandez, REYES   [facility]   [phone]  6/27/2019 1:43 PM

## 2019-07-05 ENCOUNTER — TELEPHONE (OUTPATIENT)
Dept: FAMILY MEDICINE | Facility: CLINIC | Age: 84
End: 2019-07-05

## 2019-07-05 DIAGNOSIS — J44.9 CHRONIC OBSTRUCTIVE PULMONARY DISEASE, UNSPECIFIED COPD TYPE (H): ICD-10-CM

## 2019-07-05 DIAGNOSIS — J44.9 COPD (CHRONIC OBSTRUCTIVE PULMONARY DISEASE) (H): ICD-10-CM

## 2019-07-05 NOTE — TELEPHONE ENCOUNTER
Reason for Call:  Other patient info     Detailed comments: Oxygen saturation test results and visit notes need to be send to [facility] in order for insurance to cover oxygen tank .  Fax to [phone]    Phone Number Patient can be reached at: Other phone number: [phone]    Best Time: any     Can we leave a detailed message on this number? YES    Call taken on 7/5/2019 at 2:10 PM by Tanya Kenny

## 2019-08-19 NOTE — TELEPHONE ENCOUNTER
TC does not see that a new oxygen order has been placed. TC also unable to locate oxygen saturation test. Routing to PCP to place oxygen order and also advise on where to locate the oxygen saturation test.

## 2019-08-19 NOTE — TELEPHONE ENCOUNTER
Lidia states she keeps getting bills for oxygen, she contacted [facility] and they told her a new prescription, chart notes and oxygen saturation test result are still needed. Please fax to [facility] at [phone].    Please call Lidia when everything is faxed or with any issues at [phone].    Thank you,  Laurne CABRERA    [facility]

## 2019-08-20 NOTE — TELEPHONE ENCOUNTER
I reprinted the oxygen order. On the order is the details of the oxygen saturation test ( date and results, etc )    Jose Doyle MD

## 2019-09-06 DIAGNOSIS — H40.1492 PSEUDOEXFOLIATION GLAUCOMA, MODERATE STAGE: ICD-10-CM

## 2019-09-06 RX ORDER — LATANOPROST 50 UG/ML
1 SOLUTION/ DROPS OPHTHALMIC AT BEDTIME
Qty: 2.5 ML | Refills: 0 | Status: SHIPPED | OUTPATIENT
Start: 2019-09-06 | End: 2019-09-25

## 2019-09-06 NOTE — TELEPHONE ENCOUNTER
Patient is 5 months over do for IOP check. Recommend filling 1 bottle of Latanoprost and have  call to make appointment.

## 2019-09-25 DIAGNOSIS — H40.1492 PSEUDOEXFOLIATION GLAUCOMA, MODERATE STAGE: ICD-10-CM

## 2019-09-25 RX ORDER — LATANOPROST 50 UG/ML
1 SOLUTION/ DROPS OPHTHALMIC AT BEDTIME
Qty: 2.5 ML | Refills: 1 | Status: SHIPPED | OUTPATIENT
Start: 2019-09-25 | End: 2019-01-01

## 2019-09-25 NOTE — TELEPHONE ENCOUNTER
Last appointment:  January of 2019.  No future appointments scheduled despite the request to make an appointment earlier this month.   Our  will (again) attempt to contact the patient and schedule a pressure check per Dr. Amos.  A message was left on her voicemail requesting her to call our office and schedule an appointment with Dr. Amos.

## 2019-10-09 NOTE — TELEPHONE ENCOUNTER
Nurse sees this medication was discontinued at her 6/3/19 office visit.     Attempted to call the patient but voicemail is full and unable to leave a message.     Called pharmacy and informed them I was unable to reach the patient but it shows on our end the medication was discontinued. They added a note to inform the patient to call the clinic regarding this medication.     Lauren Logan RN

## 2019-10-09 NOTE — TELEPHONE ENCOUNTER
Reason for Call:  Medication or medication refill:    Do you use a [facility]?  Name of the pharmacy and phone number for the current request:       [id number] - [place], [place] - [address] AT [facility] [address]      Name of the medication requested: Spiriva  Other request: Pharmacy is calling to request a refill,  They state they faxed the request over on 10/3/19 and have not heard back from the clinic.  Patient keeps contacting the pharmacy.    Can we leave a detailed message on this number? YES    Phone number patient can be reached at: [phone]    Best Time:  Any    Call taken on 10/9/2019 at 2:13 PM by Sunitha Schwab

## 2019-10-10 NOTE — TELEPHONE ENCOUNTER
Routing to PCP to review and advise.    Looks like you discontinued the Spiriva on 6/3/2019?      Clementina Joshua RN  [facility]

## 2019-10-10 NOTE — TELEPHONE ENCOUNTER
Please clarify with patient or family that she is taking this med and that it is helping her breathing ( she may have forgotten that she was on it when I saw them last in clinic )    If she is on it, we can send in prescription    I pended prescription    Jose Doyle MD

## 2019-10-11 NOTE — TELEPHONE ENCOUNTER
Attempt # 2  Called patient at home number.[phone] and [phone] this mail box full.  Was call answered?  No answer, left message to call nurse line at [phone]    Clementina Joshua RN  [facility]

## 2019-10-11 NOTE — TELEPHONE ENCOUNTER
Patient returned call - nurse picked up - patient states yes is still taking the Spiriva inhaler.   Nurse sent refill per message from provider.    Clementina Joshua RN  [facility]

## 2019-11-18 NOTE — TELEPHONE ENCOUNTER
Attempted to contact pt to let her know that she is due to schedule a TA with Dr. Amos for anymore refills.  Pt's vm box was full, so I sent her a [employer] message to inform her.

## 2019-11-18 NOTE — TELEPHONE ENCOUNTER
Last appointment: January of 2019.  Patient was directed to return in 3 months for a pressure check.  no appointments have been scheduled.    will again attempt to contact the patient and schedule an appointment.  Also, message added to refill to schedule an appointment.

## 2019-11-18 NOTE — TELEPHONE ENCOUNTER
Fax received from pharmacy requesting refill of latanoprost (XALATAN) 0.005 % ophthalmic solution.

## 2019-11-20 NOTE — TELEPHONE ENCOUNTER
OPTUM PAF Project printed and given to PCP or MA for completion at patient's on 11/21/2019 appointment  Routing to Provider to sign EPIC order (pended in this encounter) once appointment is complete  TC to fax to [phone] to process once it has been signed    .Shanae RACHEL    [facility]

## 2019-11-25 NOTE — TELEPHONE ENCOUNTER
OPTUM PAF Project printed and given to PCP or MA for completion at patient's on 11/26/2019 appointment  Routing to Provider to sign EPIC order (pended in this encounter) once appointment is complete  TC to fax to [phone] to process once it has been signed    .Shanae RACHEL    [facility]

## 2019-11-26 NOTE — PATIENT INSTRUCTIONS
Take medications as directed.  Check with your PCP or medical supply  equipment to get a new oxygen saturation monitor if needed   Cares and treatment  discussed   Follow up if problem or concern

## 2019-11-26 NOTE — PROGRESS NOTES
"Subjective     Jeb Palomino is a 84 year old female who presents to clinic today for the following health issues:    HPI        Just wants to get checked out to make sure everything is ding ok for her .  Forms: Pt Present today for determination of \"oxygen sats \" for insurance purposes.   She is on home O2, and thinks her monitor to check her O2 sat at home is not working as good, so she may need a new one.   She got it from a home equipment store previously       COPD Follow-Up    Overall, how are your COPD symptoms since your last clinic visit?  No change    How much fatigue or shortness of breath do you have when you are walking?  Same as usual    How much shortness of breath do you have when you are resting?  Same as usual    How often do you cough? Sometimes not change     Have you noticed any change in your sputum/phlegm?  No    Have you experienced a recent fever? No    Please describe how far you can walk without stopping to rest:  The length of her bed  Rooms around the house     How many flights of stairs are you able to walk up without stopping? She can get few steps by herself , but does better with help mostly bc of her balance issue as well , although no new change. She lives on lower level of home,of her son and daughter in law      Have you had any Emergency Room Visits, Urgent Care Visits, or Hospital Admissions because of your COPD since your last office visit?  No      Hyperlipidemia Follow-Up      Are you regularly taking any medication or supplement to lower your cholesterol?   Yes-     Are you having muscle aches or other side effects that you think could be caused by your cholesterol lowering medication?  No       Feeling well and no problem taking med's   Hypertension Follow-up      Do you check your blood pressure regularly outside of the clinic? Yes  She thinsk BP is always good. Denies any chest pain, palpitation sob, leg .     Are you following a low salt diet? Yes    Are your blood " pressures ever more than 140 on the top number (systolic) OR more   than 90 on the bottom number (diastolic), for example 140/90? No          History   Smoking Status     Former Smoker     Types: Cigarettes     Quit date: 2/9/2009   Smokeless Tobacco     Never Used     Lab Results   Component Value Date    FEV1 30 11/06/2012    UNI9SAU 66 11/06/2012         How many servings of fruits and vegetables do you eat daily?  2-3    On average, how many sweetened beverages do you drink each day (Examples: soda, juice, sweet tea, etc.  Do NOT count diet or artificially sweetened beverages)?   1 coca cola daily     How many days per week do you miss taking your medication? 0          Patient Active Problem List   Diagnosis     COPD (chronic obstructive pulmonary disease) (H)     Carotid stenosis     HYPERLIPIDEMIA LDL GOAL <130     Pseudophakia, ou     Hypertension goal BP (blood pressure) < 140/90     Advanced directives, counseling/discussion     Pseudoexfoliation glaucoma, moderate stage     Chronic obstructive pulmonary disease, unspecified COPD type (H)     Hx of hemiretinal artery occlusion, inferior, od     Past Surgical History:   Procedure Laterality Date     CATARACT IOL, RT/LT       COLONOSCOPY  2009    Normal w/[facility]      LASER SELECTIVE TRABECULOPLASTY  3/2008    right eye temp 180     LASER SELECTIVE TRABECULOPLASTY  12/2016; 11/2018    left eye inf 180; left eye sup 180     PHACOEMULSIFICATION WITH STANDARD INTRAOCULAR LENS IMPLANT  3/2001; 4/2001    right eye; left eye       Social History     Tobacco Use     Smoking status: Former Smoker     Types: Cigarettes     Last attempt to quit: 2/9/2009     Years since quitting: 10.8     Smokeless tobacco: Never Used   Substance Use Topics     Alcohol use: Yes     Comment: Glass of wine daily     Family History   Problem Relation Age of Onset     Cardiovascular Father      Diabetes Other         cousin     Asthma Maternal Grandmother      Cerebrovascular Disease  Maternal Grandmother      Breast Cancer Maternal Aunt      Cancer Maternal Aunt         ovarian late in life             Reviewed and updated as needed this visit by Provider         Review of Systems   ROS COMP: Constitutional, HEENT, cardiovascular, pulmonary, GI, , musculoskeletal, neuro, skin, endocrine and psych systems are negative, except as otherwise noted.      Objective    There were no vitals taken for this visit.  There is no height or weight on file to calculate BMI.  Physical Exam   GENERAL: healthy, alert and no distress looks comfortable sitting on her wheel chair with her O2 on per nasal canula   EYES: Eyes grossly normal to inspection, and conjunctivae and sclerae normal  HENT: ear canals and TM's normal, nose and mouth without ulcers or lesions  NECK: no adenopathy, no asymmetry, masses,   RESP: lungs clear to auscultation - no rales, rhonchi or wheezes  CV: regular rate and rhythm, normal S1 S2,    ABDOMEN: soft, nontender,   MS: no edema  PSYCH: mentation appears normal, affect normal            Assessment & Plan     (J44.9) Chronic obstructive pulmonary disease, unspecified COPD type (H)  (primary encounter diagnosis)  Comment:   Plan: currently on home 02. sat's look stable today     (I10) Hypertension goal BP (blood pressure) < 140/90  Comment:   Plan: BP in adequate control. Discussed cares, low fat low salt  diet etc. encouraged home BP monitoring. Follow up recheck f/u with PCP  if problem.       (I65.29) Stenosis of carotid artery, unspecified laterality  Comment:   Plan: stable, seeing cardiology as well     (E78.5) Hyperlipidemia LDL goal <130  Comment:   Plan: she is stable on current med's     (Z23) Need for prophylactic vaccination and inoculation against influenza  Comment:   Plan: INFLUENZA (HIGH DOSE) 3 VALENT VACCINE [70627],        ADMIN INFLUENZA (For MEDICARE Patients ONLY)         []          Cares and concerns/ treatment. discussed.  follow up if problem   Patient  expressed understanding and agreement with treatment plan. All patient's questions were answered, will let me know if has more later.  Medications: Rx's: Reviewed.        Return in about 3 months (around 2/26/2020), or with pcp, jessenia if problem .    Kelsi Marino MD  [facility]

## 2019-12-03 NOTE — TELEPHONE ENCOUNTER
Reason for Call:  Other Forms     Detailed comments: Patient daughter is calling because she is saying that patient needs to have all chart notes and results sent over to [facility];   FAX: [phone]    Phone Number Patient can be reached at: Other phone number:  [phone]    Best Time: anytime     Can we leave a detailed message on this number? YES    Call taken on 12/3/2019 at 3:19 PM by Eve Obrien

## 2019-12-04 NOTE — TELEPHONE ENCOUNTER
Tried to call number that was given, number is no longer in service. Unable to contact TC unable to contact patient. TC will wait until daughter calls back.      .Shanae RACHEL    [facility] [place]

## 2019-12-20 NOTE — TELEPHONE ENCOUNTER
"Requested Prescriptions   Pending Prescriptions Disp Refills     atenolol (TENORMIN) 25 MG tablet  Last Written Prescription Date:  6/3/19  Last Fill Quantity: 180,  # refills: 3   Last office visit: 11/26/2019 with prescribing provider:  PALLAVI Marino   Future Office Visit:     180 tablet 3     Sig: Take 2 tablets (50 mg) by mouth daily       Beta-Blockers Protocol Passed - 12/19/2019  6:31 PM        Passed - Blood pressure under 140/90 in past 12 months     BP Readings from Last 3 Encounters:   11/26/19 118/74   06/03/19 138/78   10/22/18 120/73                 Passed - Patient is age 6 or older        Passed - Recent (12 mo) or future (30 days) visit within the authorizing provider's specialty     Patient has had an office visit with the authorizing provider or a provider within the authorizing providers department within the previous 12 mos or has a future within next 30 days. See \"Patient Info\" tab in inbasket, or \"Choose Columns\" in Meds & Orders section of the refill encounter.              Passed - Medication is active on med list          "

## 2019-12-20 NOTE — TELEPHONE ENCOUNTER
Prescription approved per [facility] Refill Protocol.  With no refills is due for follow up in February.      Clementina Joshua RN  [facility]

## 2020-01-01 ENCOUNTER — TELEPHONE (OUTPATIENT)
Dept: FAMILY MEDICINE | Facility: CLINIC | Age: 85
End: 2020-01-01

## 2020-01-01 ENCOUNTER — MEDICAL CORRESPONDENCE (OUTPATIENT)
Dept: HEALTH INFORMATION MANAGEMENT | Facility: CLINIC | Age: 85
End: 2020-01-01

## 2020-01-01 ENCOUNTER — HOSPITAL ENCOUNTER (INPATIENT)
Facility: CLINIC | Age: 85
LOS: 4 days | DRG: 871 | End: 2020-10-02
Attending: PHYSICIAN ASSISTANT | Admitting: HOSPITALIST
Payer: COMMERCIAL

## 2020-01-01 ENCOUNTER — MYC REFILL (OUTPATIENT)
Dept: OTHER | Age: 85
End: 2020-01-01

## 2020-01-01 ENCOUNTER — TELEPHONE (OUTPATIENT)
Dept: OPHTHALMOLOGY | Facility: CLINIC | Age: 85
End: 2020-01-01

## 2020-01-01 ENCOUNTER — APPOINTMENT (OUTPATIENT)
Dept: GENERAL RADIOLOGY | Facility: CLINIC | Age: 85
DRG: 871 | End: 2020-01-01
Attending: PHYSICIAN ASSISTANT
Payer: COMMERCIAL

## 2020-01-01 VITALS
HEIGHT: 60 IN | OXYGEN SATURATION: 94 % | SYSTOLIC BLOOD PRESSURE: 75 MMHG | WEIGHT: 115.08 LBS | HEART RATE: 87 BPM | BODY MASS INDEX: 22.59 KG/M2 | RESPIRATION RATE: 24 BRPM | DIASTOLIC BLOOD PRESSURE: 41 MMHG | TEMPERATURE: 98.1 F

## 2020-01-01 DIAGNOSIS — J44.1 COPD EXACERBATION (H): ICD-10-CM

## 2020-01-01 DIAGNOSIS — E78.5 HYPERLIPIDEMIA LDL GOAL <130: ICD-10-CM

## 2020-01-01 DIAGNOSIS — J44.9 CHRONIC OBSTRUCTIVE PULMONARY DISEASE, UNSPECIFIED COPD TYPE (H): ICD-10-CM

## 2020-01-01 DIAGNOSIS — I10 ESSENTIAL HYPERTENSION WITH GOAL BLOOD PRESSURE LESS THAN 140/90: ICD-10-CM

## 2020-01-01 DIAGNOSIS — H40.1492 PSEUDOEXFOLIATION GLAUCOMA, MODERATE STAGE: ICD-10-CM

## 2020-01-01 DIAGNOSIS — J96.21 ACUTE ON CHRONIC RESPIRATORY FAILURE WITH HYPOXIA (H): ICD-10-CM

## 2020-01-01 DIAGNOSIS — J18.9 PNEUMONIA DUE TO INFECTIOUS ORGANISM, UNSPECIFIED LATERALITY, UNSPECIFIED PART OF LUNG: ICD-10-CM

## 2020-01-01 DIAGNOSIS — R65.21 SEPTIC SHOCK (H): ICD-10-CM

## 2020-01-01 DIAGNOSIS — A41.9 SEPTIC SHOCK (H): ICD-10-CM

## 2020-01-01 LAB
ALBUMIN SERPL-MCNC: 2.2 G/DL (ref 3.4–5)
ALBUMIN SERPL-MCNC: 2.2 G/DL (ref 3.4–5)
ALP SERPL-CCNC: 135 U/L (ref 40–150)
ALP SERPL-CCNC: 95 U/L (ref 40–150)
ALT SERPL W P-5'-P-CCNC: 11 U/L (ref 0–50)
ALT SERPL W P-5'-P-CCNC: 14 U/L (ref 0–50)
ANION GAP SERPL CALCULATED.3IONS-SCNC: 6 MMOL/L (ref 3–14)
ANION GAP SERPL CALCULATED.3IONS-SCNC: <1 MMOL/L (ref 3–14)
AST SERPL W P-5'-P-CCNC: 19 U/L (ref 0–45)
AST SERPL W P-5'-P-CCNC: 42 U/L (ref 0–45)
BASE DEFICIT BLDV-SCNC: 0.5 MMOL/L
BASE EXCESS BLDA CALC-SCNC: 0.4 MMOL/L
BASE EXCESS BLDV CALC-SCNC: 0.4 MMOL/L
BASE EXCESS BLDV CALC-SCNC: 0.5 MMOL/L
BASE EXCESS BLDV CALC-SCNC: 1.9 MMOL/L
BASE EXCESS BLDV CALC-SCNC: 4.7 MMOL/L
BASOPHILS # BLD AUTO: 0 10E9/L (ref 0–0.2)
BASOPHILS NFR BLD AUTO: 0.2 %
BILIRUB DIRECT SERPL-MCNC: <0.1 MG/DL (ref 0–0.2)
BILIRUB SERPL-MCNC: 0.3 MG/DL (ref 0.2–1.3)
BILIRUB SERPL-MCNC: 0.4 MG/DL (ref 0.2–1.3)
BUN SERPL-MCNC: 10 MG/DL (ref 7–30)
BUN SERPL-MCNC: 11 MG/DL (ref 7–30)
CALCIUM SERPL-MCNC: 7.9 MG/DL (ref 8.5–10.1)
CALCIUM SERPL-MCNC: 9.1 MG/DL (ref 8.5–10.1)
CHLORIDE SERPL-SCNC: 105 MMOL/L (ref 94–109)
CHLORIDE SERPL-SCNC: 93 MMOL/L (ref 94–109)
CO2 SERPL-SCNC: 28 MMOL/L (ref 20–32)
CO2 SERPL-SCNC: 39 MMOL/L (ref 20–32)
CREAT SERPL-MCNC: 0.67 MG/DL (ref 0.52–1.04)
CREAT SERPL-MCNC: 0.67 MG/DL (ref 0.52–1.04)
CRP SERPL-MCNC: 9.1 MG/L (ref 0–8)
DIFFERENTIAL METHOD BLD: ABNORMAL
EOSINOPHIL # BLD AUTO: 0 10E9/L (ref 0–0.7)
EOSINOPHIL NFR BLD AUTO: 0.3 %
ERYTHROCYTE [DISTWIDTH] IN BLOOD BY AUTOMATED COUNT: 13.1 % (ref 10–15)
ERYTHROCYTE [DISTWIDTH] IN BLOOD BY AUTOMATED COUNT: 13.2 % (ref 10–15)
ETHANOL SERPL-MCNC: <0.01 G/DL
FERRITIN SERPL-MCNC: 515 NG/ML (ref 8–252)
GFR SERPL CREATININE-BSD FRML MDRD: 80 ML/MIN/{1.73_M2}
GFR SERPL CREATININE-BSD FRML MDRD: 81 ML/MIN/{1.73_M2}
GLUCOSE BLDC GLUCOMTR-MCNC: 108 MG/DL (ref 70–99)
GLUCOSE BLDC GLUCOMTR-MCNC: 110 MG/DL (ref 70–99)
GLUCOSE SERPL-MCNC: 122 MG/DL (ref 70–99)
GLUCOSE SERPL-MCNC: 74 MG/DL (ref 70–99)
HCO3 BLD-SCNC: 29 MMOL/L (ref 21–28)
HCO3 BLDV-SCNC: 29 MMOL/L (ref 21–28)
HCO3 BLDV-SCNC: 30 MMOL/L (ref 21–28)
HCO3 BLDV-SCNC: 32 MMOL/L (ref 21–28)
HCO3 BLDV-SCNC: 34 MMOL/L (ref 21–28)
HCO3 BLDV-SCNC: 38 MMOL/L (ref 21–28)
HCT VFR BLD AUTO: 31.4 % (ref 35–47)
HCT VFR BLD AUTO: 37 % (ref 35–47)
HGB BLD-MCNC: 11.3 G/DL (ref 11.7–15.7)
HGB BLD-MCNC: 9.5 G/DL (ref 11.7–15.7)
IMM GRANULOCYTES # BLD: 0.1 10E9/L (ref 0–0.4)
IMM GRANULOCYTES NFR BLD: 1.1 %
INTERPRETATION ECG - MUSE: NORMAL
INTERPRETATION ECG - MUSE: NORMAL
LABORATORY COMMENT REPORT: NORMAL
LABORATORY COMMENT REPORT: NORMAL
LACTATE BLD-SCNC: 1.5 MMOL/L (ref 0.7–2)
LACTATE BLD-SCNC: 2.4 MMOL/L (ref 0.7–2)
LACTATE BLD-SCNC: 2.4 MMOL/L (ref 0.7–2)
LACTATE BLD-SCNC: 2.6 MMOL/L (ref 0.7–2)
LYMPHOCYTES # BLD AUTO: 1.4 10E9/L (ref 0.8–5.3)
LYMPHOCYTES NFR BLD AUTO: 21.7 %
MAGNESIUM SERPL-MCNC: 1.6 MG/DL (ref 1.6–2.3)
MCH RBC QN AUTO: 30.2 PG (ref 26.5–33)
MCH RBC QN AUTO: 30.5 PG (ref 26.5–33)
MCHC RBC AUTO-ENTMCNC: 30.3 G/DL (ref 31.5–36.5)
MCHC RBC AUTO-ENTMCNC: 30.5 G/DL (ref 31.5–36.5)
MCV RBC AUTO: 100 FL (ref 78–100)
MCV RBC AUTO: 100 FL (ref 78–100)
MONOCYTES # BLD AUTO: 0.4 10E9/L (ref 0–1.3)
MONOCYTES NFR BLD AUTO: 6.2 %
NEUTROPHILS # BLD AUTO: 4.4 10E9/L (ref 1.6–8.3)
NEUTROPHILS NFR BLD AUTO: 70.5 %
NRBC # BLD AUTO: 0 10*3/UL
NRBC BLD AUTO-RTO: 0 /100
NT-PROBNP SERPL-MCNC: 5758 PG/ML (ref 0–1800)
O2/TOTAL GAS SETTING VFR VENT: 40 %
O2/TOTAL GAS SETTING VFR VENT: ABNORMAL %
OXYHGB MFR BLD: 98 % (ref 92–100)
OXYHGB MFR BLDV: 33 %
OXYHGB MFR BLDV: 47 %
OXYHGB MFR BLDV: 70 %
OXYHGB MFR BLDV: 71 %
OXYHGB MFR BLDV: 73 %
PCO2 BLD: 67 MM HG (ref 35–45)
PCO2 BLDV: 112 MM HG (ref 40–50)
PCO2 BLDV: 123 MM HG (ref 40–50)
PCO2 BLDV: 65 MM HG (ref 40–50)
PCO2 BLDV: 88 MM HG (ref 40–50)
PCO2 BLDV: 93 MM HG (ref 40–50)
PH BLD: 7.24 PH (ref 7.35–7.45)
PH BLDV: 7.09 PH (ref 7.32–7.43)
PH BLDV: 7.1 PH (ref 7.32–7.43)
PH BLDV: 7.12 PH (ref 7.32–7.43)
PH BLDV: 7.16 PH (ref 7.32–7.43)
PH BLDV: 7.25 PH (ref 7.32–7.43)
PHOSPHATE SERPL-MCNC: 2.5 MG/DL (ref 2.5–4.5)
PLATELET # BLD AUTO: 163 10E9/L (ref 150–450)
PLATELET # BLD AUTO: 224 10E9/L (ref 150–450)
PO2 BLD: 159 MM HG (ref 80–105)
PO2 BLDV: 25 MM HG (ref 25–47)
PO2 BLDV: 31 MM HG (ref 25–47)
PO2 BLDV: 39 MM HG (ref 25–47)
PO2 BLDV: 42 MM HG (ref 25–47)
PO2 BLDV: 43 MM HG (ref 25–47)
POTASSIUM SERPL-SCNC: 4.6 MMOL/L (ref 3.4–5.3)
POTASSIUM SERPL-SCNC: 5.3 MMOL/L (ref 3.4–5.3)
PROT SERPL-MCNC: 6 G/DL (ref 6.8–8.8)
PROT SERPL-MCNC: 7.2 G/DL (ref 6.8–8.8)
RBC # BLD AUTO: 3.15 10E12/L (ref 3.8–5.2)
RBC # BLD AUTO: 3.71 10E12/L (ref 3.8–5.2)
SARS-COV-2 RNA SPEC QL NAA+PROBE: NEGATIVE
SARS-COV-2 RNA SPEC QL NAA+PROBE: NEGATIVE
SARS-COV-2 RNA SPEC QL NAA+PROBE: NORMAL
SARS-COV-2 RNA SPEC QL NAA+PROBE: NORMAL
SODIUM SERPL-SCNC: 132 MMOL/L (ref 133–144)
SODIUM SERPL-SCNC: 139 MMOL/L (ref 133–144)
SPECIMEN SOURCE: NORMAL
TROPONIN I SERPL-MCNC: <0.015 UG/L (ref 0–0.04)
WBC # BLD AUTO: 6.2 10E9/L (ref 4–11)
WBC # BLD AUTO: 6.3 10E9/L (ref 4–11)

## 2020-01-01 PROCEDURE — P9041 ALBUMIN (HUMAN),5%, 50ML: HCPCS | Performed by: HOSPITALIST

## 2020-01-01 PROCEDURE — 25000128 H RX IP 250 OP 636: Performed by: INTERNAL MEDICINE

## 2020-01-01 PROCEDURE — 12000011 ZZH R&B MS OVERFLOW

## 2020-01-01 PROCEDURE — 96367 TX/PROPH/DG ADDL SEQ IV INF: CPT

## 2020-01-01 PROCEDURE — 83880 ASSAY OF NATRIURETIC PEPTIDE: CPT | Performed by: PHYSICIAN ASSISTANT

## 2020-01-01 PROCEDURE — 36415 COLL VENOUS BLD VENIPUNCTURE: CPT | Performed by: HOSPITALIST

## 2020-01-01 PROCEDURE — 93005 ELECTROCARDIOGRAM TRACING: CPT

## 2020-01-01 PROCEDURE — 80053 COMPREHEN METABOLIC PANEL: CPT | Performed by: INTERNAL MEDICINE

## 2020-01-01 PROCEDURE — 99291 CRITICAL CARE FIRST HOUR: CPT | Performed by: NURSE PRACTITIONER

## 2020-01-01 PROCEDURE — 99292 CRITICAL CARE ADDL 30 MIN: CPT

## 2020-01-01 PROCEDURE — 99291 CRITICAL CARE FIRST HOUR: CPT | Mod: 25

## 2020-01-01 PROCEDURE — 80076 HEPATIC FUNCTION PANEL: CPT | Performed by: PHYSICIAN ASSISTANT

## 2020-01-01 PROCEDURE — 82805 BLOOD GASES W/O2 SATURATION: CPT | Performed by: HOSPITALIST

## 2020-01-01 PROCEDURE — 83605 ASSAY OF LACTIC ACID: CPT | Performed by: HOSPITALIST

## 2020-01-01 PROCEDURE — 00000146 ZZHCL STATISTIC GLUCOSE BY METER IP

## 2020-01-01 PROCEDURE — U0003 INFECTIOUS AGENT DETECTION BY NUCLEIC ACID (DNA OR RNA); SEVERE ACUTE RESPIRATORY SYNDROME CORONAVIRUS 2 (SARS-COV-2) (CORONAVIRUS DISEASE [COVID-19]), AMPLIFIED PROBE TECHNIQUE, MAKING USE OF HIGH THROUGHPUT TECHNOLOGIES AS DESCRIBED BY CMS-2020-01-R: HCPCS | Performed by: PHYSICIAN ASSISTANT

## 2020-01-01 PROCEDURE — 96366 THER/PROPH/DIAG IV INF ADDON: CPT

## 2020-01-01 PROCEDURE — U0003 INFECTIOUS AGENT DETECTION BY NUCLEIC ACID (DNA OR RNA); SEVERE ACUTE RESPIRATORY SYNDROME CORONAVIRUS 2 (SARS-COV-2) (CORONAVIRUS DISEASE [COVID-19]), AMPLIFIED PROBE TECHNIQUE, MAKING USE OF HIGH THROUGHPUT TECHNOLOGIES AS DESCRIBED BY CMS-2020-01-R: HCPCS | Performed by: HOSPITALIST

## 2020-01-01 PROCEDURE — C9803 HOPD COVID-19 SPEC COLLECT: HCPCS

## 2020-01-01 PROCEDURE — 99223 1ST HOSP IP/OBS HIGH 75: CPT | Mod: AI | Performed by: HOSPITALIST

## 2020-01-01 PROCEDURE — 25000125 ZZHC RX 250

## 2020-01-01 PROCEDURE — 25800030 ZZH RX IP 258 OP 636: Performed by: HOSPITALIST

## 2020-01-01 PROCEDURE — 85027 COMPLETE CBC AUTOMATED: CPT | Performed by: HOSPITALIST

## 2020-01-01 PROCEDURE — 96368 THER/DIAG CONCURRENT INF: CPT

## 2020-01-01 PROCEDURE — 25000132 ZZH RX MED GY IP 250 OP 250 PS 637: Performed by: HOSPITALIST

## 2020-01-01 PROCEDURE — 250N000011 HC RX IP 250 OP 636: Performed by: NURSE PRACTITIONER

## 2020-01-01 PROCEDURE — 25000128 H RX IP 250 OP 636: Performed by: HOSPITALIST

## 2020-01-01 PROCEDURE — 84100 ASSAY OF PHOSPHORUS: CPT | Performed by: INTERNAL MEDICINE

## 2020-01-01 PROCEDURE — 87040 BLOOD CULTURE FOR BACTERIA: CPT | Performed by: PHYSICIAN ASSISTANT

## 2020-01-01 PROCEDURE — 80320 DRUG SCREEN QUANTALCOHOLS: CPT | Performed by: PHYSICIAN ASSISTANT

## 2020-01-01 PROCEDURE — 40000275 ZZH STATISTIC RCP TIME EA 10 MIN

## 2020-01-01 PROCEDURE — 99233 SBSQ HOSP IP/OBS HIGH 50: CPT | Performed by: HOSPITALIST

## 2020-01-01 PROCEDURE — 94660 CPAP INITIATION&MGMT: CPT

## 2020-01-01 PROCEDURE — 94640 AIRWAY INHALATION TREATMENT: CPT

## 2020-01-01 PROCEDURE — 83605 ASSAY OF LACTIC ACID: CPT | Performed by: PHYSICIAN ASSISTANT

## 2020-01-01 PROCEDURE — 83735 ASSAY OF MAGNESIUM: CPT | Performed by: INTERNAL MEDICINE

## 2020-01-01 PROCEDURE — 86140 C-REACTIVE PROTEIN: CPT | Performed by: INTERNAL MEDICINE

## 2020-01-01 PROCEDURE — 85025 COMPLETE CBC W/AUTO DIFF WBC: CPT | Performed by: PHYSICIAN ASSISTANT

## 2020-01-01 PROCEDURE — 36600 WITHDRAWAL OF ARTERIAL BLOOD: CPT

## 2020-01-01 PROCEDURE — 84484 ASSAY OF TROPONIN QUANT: CPT | Performed by: PHYSICIAN ASSISTANT

## 2020-01-01 PROCEDURE — 96365 THER/PROPH/DIAG IV INF INIT: CPT | Mod: 59

## 2020-01-01 PROCEDURE — 99232 SBSQ HOSP IP/OBS MODERATE 35: CPT | Performed by: HOSPITALIST

## 2020-01-01 PROCEDURE — 36415 COLL VENOUS BLD VENIPUNCTURE: CPT | Performed by: INTERNAL MEDICINE

## 2020-01-01 PROCEDURE — 25000132 ZZH RX MED GY IP 250 OP 250 PS 637: Performed by: PHYSICIAN ASSISTANT

## 2020-01-01 PROCEDURE — 80048 BASIC METABOLIC PNL TOTAL CA: CPT | Performed by: PHYSICIAN ASSISTANT

## 2020-01-01 PROCEDURE — 82805 BLOOD GASES W/O2 SATURATION: CPT | Performed by: PHYSICIAN ASSISTANT

## 2020-01-01 PROCEDURE — 99238 HOSP IP/OBS DSCHRG MGMT 30/<: CPT | Performed by: NURSE PRACTITIONER

## 2020-01-01 PROCEDURE — 96375 TX/PRO/DX INJ NEW DRUG ADDON: CPT

## 2020-01-01 PROCEDURE — 82728 ASSAY OF FERRITIN: CPT | Performed by: INTERNAL MEDICINE

## 2020-01-01 PROCEDURE — 25000125 ZZHC RX 250: Performed by: HOSPITALIST

## 2020-01-01 PROCEDURE — 120N000001 HC R&B MED SURG/OB

## 2020-01-01 PROCEDURE — 94640 AIRWAY INHALATION TREATMENT: CPT | Mod: 76

## 2020-01-01 PROCEDURE — 80076 HEPATIC FUNCTION PANEL: CPT | Performed by: EMERGENCY MEDICINE

## 2020-01-01 PROCEDURE — 99232 SBSQ HOSP IP/OBS MODERATE 35: CPT | Performed by: INTERNAL MEDICINE

## 2020-01-01 PROCEDURE — 82805 BLOOD GASES W/O2 SATURATION: CPT | Performed by: EMERGENCY MEDICINE

## 2020-01-01 PROCEDURE — 25000128 H RX IP 250 OP 636: Performed by: NURSE PRACTITIONER

## 2020-01-01 PROCEDURE — 25000125 ZZHC RX 250: Performed by: INTERNAL MEDICINE

## 2020-01-01 PROCEDURE — 83605 ASSAY OF LACTIC ACID: CPT | Performed by: EMERGENCY MEDICINE

## 2020-01-01 PROCEDURE — 25000125 ZZHC RX 250: Performed by: EMERGENCY MEDICINE

## 2020-01-01 PROCEDURE — 25000128 H RX IP 250 OP 636: Performed by: PHYSICIAN ASSISTANT

## 2020-01-01 PROCEDURE — 25800030 ZZH RX IP 258 OP 636: Performed by: EMERGENCY MEDICINE

## 2020-01-01 PROCEDURE — 25000132 ZZH RX MED GY IP 250 OP 250 PS 637: Performed by: NURSE PRACTITIONER

## 2020-01-01 PROCEDURE — 25000128 H RX IP 250 OP 636: Performed by: EMERGENCY MEDICINE

## 2020-01-01 PROCEDURE — 25800030 ZZH RX IP 258 OP 636: Performed by: NURSE PRACTITIONER

## 2020-01-01 PROCEDURE — 71045 X-RAY EXAM CHEST 1 VIEW: CPT

## 2020-01-01 PROCEDURE — 40000281 ZZH STATISTIC TRANSPORT TIME EA 15 MIN

## 2020-01-01 RX ORDER — HALOPERIDOL 5 MG/ML
2 INJECTION INTRAMUSCULAR EVERY 6 HOURS PRN
Status: DISCONTINUED | OUTPATIENT
Start: 2020-01-01 | End: 2020-01-01 | Stop reason: HOSPADM

## 2020-01-01 RX ORDER — ATENOLOL 25 MG/1
50 TABLET ORAL DAILY
Qty: 180 TABLET | Refills: 1 | Status: SHIPPED | OUTPATIENT
Start: 2020-01-01 | End: 2020-01-01

## 2020-01-01 RX ORDER — IPRATROPIUM BROMIDE AND ALBUTEROL SULFATE 2.5; .5 MG/3ML; MG/3ML
3 SOLUTION RESPIRATORY (INHALATION) EVERY 4 HOURS
Status: DISCONTINUED | OUTPATIENT
Start: 2020-01-01 | End: 2020-01-01

## 2020-01-01 RX ORDER — SODIUM CHLORIDE 9 MG/ML
INJECTION, SOLUTION INTRAVENOUS CONTINUOUS
Status: DISCONTINUED | OUTPATIENT
Start: 2020-01-01 | End: 2020-01-01

## 2020-01-01 RX ORDER — POLYETHYLENE GLYCOL 3350 17 G/17G
17 POWDER, FOR SOLUTION ORAL DAILY PRN
Status: DISCONTINUED | OUTPATIENT
Start: 2020-01-01 | End: 2020-01-01 | Stop reason: HOSPADM

## 2020-01-01 RX ORDER — METHYLPREDNISOLONE SODIUM SUCCINATE 125 MG/2ML
125 INJECTION, POWDER, LYOPHILIZED, FOR SOLUTION INTRAMUSCULAR; INTRAVENOUS ONCE
Status: COMPLETED | OUTPATIENT
Start: 2020-01-01 | End: 2020-01-01

## 2020-01-01 RX ORDER — LATANOPROST 50 UG/ML
1 SOLUTION/ DROPS OPHTHALMIC AT BEDTIME
Qty: 2.5 ML | Refills: 11 | OUTPATIENT
Start: 2020-01-01

## 2020-01-01 RX ORDER — METOPROLOL TARTRATE 1 MG/ML
2.5-5 INJECTION, SOLUTION INTRAVENOUS EVERY 4 HOURS PRN
Status: DISCONTINUED | OUTPATIENT
Start: 2020-01-01 | End: 2020-01-01

## 2020-01-01 RX ORDER — ACETAMINOPHEN 325 MG/1
650 TABLET ORAL EVERY 4 HOURS PRN
Status: DISCONTINUED | OUTPATIENT
Start: 2020-01-01 | End: 2020-01-01 | Stop reason: HOSPADM

## 2020-01-01 RX ORDER — POTASSIUM CHLORIDE 1.5 G/1.58G
20-40 POWDER, FOR SOLUTION ORAL
Status: DISCONTINUED | OUTPATIENT
Start: 2020-01-01 | End: 2020-01-01

## 2020-01-01 RX ORDER — FUROSEMIDE 20 MG
20 TABLET ORAL DAILY
COMMUNITY

## 2020-01-01 RX ORDER — NITROGLYCERIN 0.4 MG/1
0.4 TABLET SUBLINGUAL EVERY 5 MIN PRN
Status: DISCONTINUED | OUTPATIENT
Start: 2020-01-01 | End: 2020-01-01

## 2020-01-01 RX ORDER — POTASSIUM CHLORIDE 29.8 MG/ML
20 INJECTION INTRAVENOUS
Status: DISCONTINUED | OUTPATIENT
Start: 2020-01-01 | End: 2020-01-01

## 2020-01-01 RX ORDER — POTASSIUM CL/LIDO/0.9 % NACL 10MEQ/0.1L
10 INTRAVENOUS SOLUTION, PIGGYBACK (ML) INTRAVENOUS
Status: DISCONTINUED | OUTPATIENT
Start: 2020-01-01 | End: 2020-01-01

## 2020-01-01 RX ORDER — PIPERACILLIN SODIUM, TAZOBACTAM SODIUM 3; .375 G/15ML; G/15ML
3.38 INJECTION, POWDER, LYOPHILIZED, FOR SOLUTION INTRAVENOUS ONCE
Status: COMPLETED | OUTPATIENT
Start: 2020-01-01 | End: 2020-01-01

## 2020-01-01 RX ORDER — DORZOLAMIDE HCL 20 MG/ML
1 SOLUTION/ DROPS OPHTHALMIC 2 TIMES DAILY
Qty: 3 BOTTLE | Refills: 1 | Status: SHIPPED | OUTPATIENT
Start: 2020-01-01

## 2020-01-01 RX ORDER — SIMVASTATIN 20 MG
TABLET ORAL
Qty: 90 TABLET | Refills: 0 | Status: SHIPPED | OUTPATIENT
Start: 2020-01-01 | End: 2020-01-01

## 2020-01-01 RX ORDER — LIDOCAINE 40 MG/G
CREAM TOPICAL
Status: DISCONTINUED | OUTPATIENT
Start: 2020-01-01 | End: 2020-01-01

## 2020-01-01 RX ORDER — VANCOMYCIN HYDROCHLORIDE 1 G/200ML
1000 INJECTION, SOLUTION INTRAVENOUS EVERY 12 HOURS
Status: DISCONTINUED | OUTPATIENT
Start: 2020-01-01 | End: 2020-01-01

## 2020-01-01 RX ORDER — PIPERACILLIN SODIUM, TAZOBACTAM SODIUM 4; .5 G/20ML; G/20ML
4.5 INJECTION, POWDER, LYOPHILIZED, FOR SOLUTION INTRAVENOUS EVERY 6 HOURS
Status: DISCONTINUED | OUTPATIENT
Start: 2020-01-01 | End: 2020-01-01

## 2020-01-01 RX ORDER — ATENOLOL 25 MG/1
50 TABLET ORAL DAILY
Qty: 180 TABLET | Refills: 0 | Status: SHIPPED | OUTPATIENT
Start: 2020-01-01 | End: 2020-01-01

## 2020-01-01 RX ORDER — AMOXICILLIN 250 MG
2 CAPSULE ORAL 2 TIMES DAILY PRN
Status: DISCONTINUED | OUTPATIENT
Start: 2020-01-01 | End: 2020-01-01 | Stop reason: HOSPADM

## 2020-01-01 RX ORDER — POTASSIUM CHLORIDE 7.45 MG/ML
10 INJECTION INTRAVENOUS
Status: DISCONTINUED | OUTPATIENT
Start: 2020-01-01 | End: 2020-01-01

## 2020-01-01 RX ORDER — BUDESONIDE AND FORMOTEROL FUMARATE DIHYDRATE 160; 4.5 UG/1; UG/1
AEROSOL RESPIRATORY (INHALATION)
Qty: 1 INHALER | Refills: 0 | Status: SHIPPED | OUTPATIENT
Start: 2020-01-01 | End: 2020-01-01

## 2020-01-01 RX ORDER — METHYLPREDNISOLONE SODIUM SUCCINATE 125 MG/2ML
60 INJECTION, POWDER, LYOPHILIZED, FOR SOLUTION INTRAMUSCULAR; INTRAVENOUS EVERY 8 HOURS
Status: DISCONTINUED | OUTPATIENT
Start: 2020-01-01 | End: 2020-01-01

## 2020-01-01 RX ORDER — BISACODYL 10 MG
10 SUPPOSITORY, RECTAL RECTAL DAILY PRN
Status: DISCONTINUED | OUTPATIENT
Start: 2020-01-01 | End: 2020-01-01 | Stop reason: HOSPADM

## 2020-01-01 RX ORDER — ONDANSETRON 2 MG/ML
4 INJECTION INTRAMUSCULAR; INTRAVENOUS EVERY 6 HOURS PRN
Status: DISCONTINUED | OUTPATIENT
Start: 2020-01-01 | End: 2020-01-01 | Stop reason: HOSPADM

## 2020-01-01 RX ORDER — ACETAMINOPHEN 650 MG/1
650 SUPPOSITORY RECTAL EVERY 4 HOURS PRN
Status: DISCONTINUED | OUTPATIENT
Start: 2020-01-01 | End: 2020-01-01 | Stop reason: HOSPADM

## 2020-01-01 RX ORDER — MAGNESIUM SULFATE HEPTAHYDRATE 40 MG/ML
4 INJECTION, SOLUTION INTRAVENOUS EVERY 4 HOURS PRN
Status: DISCONTINUED | OUTPATIENT
Start: 2020-01-01 | End: 2020-01-01

## 2020-01-01 RX ORDER — ALBUMIN, HUMAN INJ 5% 5 %
12.5 SOLUTION INTRAVENOUS ONCE
Status: COMPLETED | OUTPATIENT
Start: 2020-01-01 | End: 2020-01-01

## 2020-01-01 RX ORDER — NALOXONE HYDROCHLORIDE 0.4 MG/ML
.1-.4 INJECTION, SOLUTION INTRAMUSCULAR; INTRAVENOUS; SUBCUTANEOUS
Status: DISCONTINUED | OUTPATIENT
Start: 2020-01-01 | End: 2020-01-01 | Stop reason: HOSPADM

## 2020-01-01 RX ORDER — SIMVASTATIN 20 MG
TABLET ORAL
Qty: 90 TABLET | Refills: 0 | Status: SHIPPED | OUTPATIENT
Start: 2020-01-01

## 2020-01-01 RX ORDER — AZITHROMYCIN 500 MG/1
500 INJECTION, POWDER, LYOPHILIZED, FOR SOLUTION INTRAVENOUS ONCE
Status: COMPLETED | OUTPATIENT
Start: 2020-01-01 | End: 2020-01-01

## 2020-01-01 RX ORDER — LORAZEPAM 0.5 MG/1
.5-1 TABLET ORAL
Status: DISCONTINUED | OUTPATIENT
Start: 2020-01-01 | End: 2020-01-01 | Stop reason: HOSPADM

## 2020-01-01 RX ORDER — LATANOPROST 50 UG/ML
1 SOLUTION/ DROPS OPHTHALMIC AT BEDTIME
Qty: 2.5 ML | Refills: 11 | Status: SHIPPED | OUTPATIENT
Start: 2020-01-01

## 2020-01-01 RX ORDER — POTASSIUM CHLORIDE 1500 MG/1
20-40 TABLET, EXTENDED RELEASE ORAL
Status: DISCONTINUED | OUTPATIENT
Start: 2020-01-01 | End: 2020-01-01

## 2020-01-01 RX ORDER — BUDESONIDE AND FORMOTEROL FUMARATE DIHYDRATE 160; 4.5 UG/1; UG/1
AEROSOL RESPIRATORY (INHALATION)
Qty: 1 INHALER | Refills: 3 | Status: SHIPPED | OUTPATIENT
Start: 2020-01-01 | End: 2020-01-01

## 2020-01-01 RX ORDER — ATENOLOL 25 MG/1
50 TABLET ORAL DAILY
Qty: 180 TABLET | Refills: 1 | Status: SHIPPED | OUTPATIENT
Start: 2020-01-01

## 2020-01-01 RX ORDER — AMOXICILLIN 250 MG
1 CAPSULE ORAL 2 TIMES DAILY PRN
Status: DISCONTINUED | OUTPATIENT
Start: 2020-01-01 | End: 2020-01-01 | Stop reason: HOSPADM

## 2020-01-01 RX ORDER — TIOTROPIUM BROMIDE 18 UG/1
CAPSULE ORAL; RESPIRATORY (INHALATION)
Qty: 90 CAPSULE | Refills: 1 | Status: SHIPPED | OUTPATIENT
Start: 2020-01-01 | End: 2020-01-01

## 2020-01-01 RX ORDER — BUDESONIDE AND FORMOTEROL FUMARATE DIHYDRATE 160; 4.5 UG/1; UG/1
2 AEROSOL RESPIRATORY (INHALATION)
Status: DISCONTINUED | OUTPATIENT
Start: 2020-01-01 | End: 2020-01-01 | Stop reason: CLARIF

## 2020-01-01 RX ORDER — NOREPINEPHRINE BITARTRATE 0.06 MG/ML
0.03-0.4 INJECTION, SOLUTION INTRAVENOUS CONTINUOUS
Status: DISCONTINUED | OUTPATIENT
Start: 2020-01-01 | End: 2020-01-01

## 2020-01-01 RX ORDER — ALBUTEROL SULFATE 0.83 MG/ML
SOLUTION RESPIRATORY (INHALATION)
Status: COMPLETED
Start: 2020-01-01 | End: 2020-01-01

## 2020-01-01 RX ORDER — ALBUTEROL SULFATE 0.83 MG/ML
2.5 SOLUTION RESPIRATORY (INHALATION) EVERY 4 HOURS PRN
Status: DISCONTINUED | OUTPATIENT
Start: 2020-01-01 | End: 2020-01-01

## 2020-01-01 RX ORDER — ALBUTEROL SULFATE 5 MG/ML
2.5 SOLUTION RESPIRATORY (INHALATION)
Status: DISPENSED | OUTPATIENT
Start: 2020-01-01 | End: 2020-01-01

## 2020-01-01 RX ORDER — ALBUTEROL SULFATE 90 UG/1
6 AEROSOL, METERED RESPIRATORY (INHALATION)
Status: DISCONTINUED | OUTPATIENT
Start: 2020-01-01 | End: 2020-01-01

## 2020-01-01 RX ORDER — GLYCOPYRROLATE 0.2 MG/ML
.2-.4 INJECTION, SOLUTION INTRAMUSCULAR; INTRAVENOUS EVERY 4 HOURS PRN
Status: DISCONTINUED | OUTPATIENT
Start: 2020-01-01 | End: 2020-01-01 | Stop reason: HOSPADM

## 2020-01-01 RX ORDER — DORZOLAMIDE HCL 20 MG/ML
1 SOLUTION/ DROPS OPHTHALMIC 2 TIMES DAILY
Status: DISCONTINUED | OUTPATIENT
Start: 2020-01-01 | End: 2020-01-01 | Stop reason: HOSPADM

## 2020-01-01 RX ORDER — PROCHLORPERAZINE 25 MG
12.5 SUPPOSITORY, RECTAL RECTAL EVERY 12 HOURS PRN
Status: DISCONTINUED | OUTPATIENT
Start: 2020-01-01 | End: 2020-01-01 | Stop reason: HOSPADM

## 2020-01-01 RX ORDER — CEFTRIAXONE 2 G/1
2 INJECTION, POWDER, FOR SOLUTION INTRAMUSCULAR; INTRAVENOUS ONCE
Status: COMPLETED | OUTPATIENT
Start: 2020-01-01 | End: 2020-01-01

## 2020-01-01 RX ORDER — METHYLPREDNISOLONE SODIUM SUCCINATE 125 MG/2ML
60 INJECTION, POWDER, LYOPHILIZED, FOR SOLUTION INTRAMUSCULAR; INTRAVENOUS EVERY 12 HOURS
Status: DISCONTINUED | OUTPATIENT
Start: 2020-01-01 | End: 2020-01-01

## 2020-01-01 RX ORDER — CARBOXYMETHYLCELLULOSE SODIUM 5 MG/ML
1-2 SOLUTION/ DROPS OPHTHALMIC EVERY 8 HOURS PRN
Status: DISCONTINUED | OUTPATIENT
Start: 2020-01-01 | End: 2020-01-01 | Stop reason: HOSPADM

## 2020-01-01 RX ORDER — ONDANSETRON 4 MG/1
4 TABLET, ORALLY DISINTEGRATING ORAL EVERY 6 HOURS PRN
Status: DISCONTINUED | OUTPATIENT
Start: 2020-01-01 | End: 2020-01-01 | Stop reason: HOSPADM

## 2020-01-01 RX ORDER — BUDESONIDE AND FORMOTEROL FUMARATE DIHYDRATE 160; 4.5 UG/1; UG/1
AEROSOL RESPIRATORY (INHALATION)
Qty: 1 INHALER | Refills: 1 | Status: SHIPPED | OUTPATIENT
Start: 2020-01-01 | End: 2020-01-01

## 2020-01-01 RX ORDER — BUDESONIDE AND FORMOTEROL FUMARATE DIHYDRATE 160; 4.5 UG/1; UG/1
AEROSOL RESPIRATORY (INHALATION)
Qty: 10.2 G | Refills: 11 | Status: SHIPPED | OUTPATIENT
Start: 2020-01-01

## 2020-01-01 RX ORDER — LORAZEPAM 2 MG/ML
.5-1 INJECTION INTRAMUSCULAR
Status: DISCONTINUED | OUTPATIENT
Start: 2020-01-01 | End: 2020-01-01 | Stop reason: HOSPADM

## 2020-01-01 RX ORDER — METOPROLOL TARTRATE 1 MG/ML
2.5 INJECTION, SOLUTION INTRAVENOUS EVERY 4 HOURS PRN
Status: DISCONTINUED | OUTPATIENT
Start: 2020-01-01 | End: 2020-01-01

## 2020-01-01 RX ORDER — DORZOLAMIDE HCL 20 MG/ML
1 SOLUTION/ DROPS OPHTHALMIC 2 TIMES DAILY
Qty: 3 BOTTLE | Refills: 4 | OUTPATIENT
Start: 2020-01-01

## 2020-01-01 RX ORDER — ALBUTEROL SULFATE 0.83 MG/ML
3 SOLUTION RESPIRATORY (INHALATION)
Status: DISCONTINUED | OUTPATIENT
Start: 2020-01-01 | End: 2020-01-01

## 2020-01-01 RX ORDER — MORPHINE SULFATE 2 MG/ML
1-2 INJECTION, SOLUTION INTRAMUSCULAR; INTRAVENOUS
Status: DISCONTINUED | OUTPATIENT
Start: 2020-01-01 | End: 2020-01-01 | Stop reason: HOSPADM

## 2020-01-01 RX ORDER — LATANOPROST 50 UG/ML
1 SOLUTION/ DROPS OPHTHALMIC AT BEDTIME
Status: DISCONTINUED | OUTPATIENT
Start: 2020-01-01 | End: 2020-01-01 | Stop reason: HOSPADM

## 2020-01-01 RX ORDER — SALIVA STIMULANT COMB. NO.3
2 SPRAY, NON-AEROSOL (ML) MUCOUS MEMBRANE
Status: DISCONTINUED | OUTPATIENT
Start: 2020-01-01 | End: 2020-01-01 | Stop reason: HOSPADM

## 2020-01-01 RX ORDER — PROCHLORPERAZINE MALEATE 5 MG
5 TABLET ORAL EVERY 6 HOURS PRN
Status: DISCONTINUED | OUTPATIENT
Start: 2020-01-01 | End: 2020-01-01 | Stop reason: HOSPADM

## 2020-01-01 RX ORDER — TIOTROPIUM BROMIDE 18 UG/1
CAPSULE ORAL; RESPIRATORY (INHALATION)
Qty: 90 CAPSULE | Refills: 0 | Status: SHIPPED | OUTPATIENT
Start: 2020-01-01

## 2020-01-01 RX ORDER — OLANZAPINE 10 MG/2ML
2.5 INJECTION, POWDER, FOR SOLUTION INTRAMUSCULAR DAILY PRN
Status: DISCONTINUED | OUTPATIENT
Start: 2020-01-01 | End: 2020-01-01 | Stop reason: HOSPADM

## 2020-01-01 RX ADMIN — VANCOMYCIN HYDROCHLORIDE 1000 MG: 1 INJECTION, SOLUTION INTRAVENOUS at 03:39

## 2020-01-01 RX ADMIN — SODIUM CHLORIDE: 9 INJECTION, SOLUTION INTRAVENOUS at 15:42

## 2020-01-01 RX ADMIN — MORPHINE SULFATE 2 MG: 2 INJECTION, SOLUTION INTRAMUSCULAR; INTRAVENOUS at 13:19

## 2020-01-01 RX ADMIN — MORPHINE SULFATE 1 MG: 2 INJECTION, SOLUTION INTRAMUSCULAR; INTRAVENOUS at 10:22

## 2020-01-01 RX ADMIN — SODIUM CHLORIDE: 9 INJECTION, SOLUTION INTRAVENOUS at 22:04

## 2020-01-01 RX ADMIN — IPRATROPIUM BROMIDE AND ALBUTEROL SULFATE 3 ML: .5; 3 SOLUTION RESPIRATORY (INHALATION) at 02:56

## 2020-01-01 RX ADMIN — LATANOPROST 1 DROP: 50 SOLUTION/ DROPS OPHTHALMIC at 21:54

## 2020-01-01 RX ADMIN — MORPHINE SULFATE 2 MG: 2 INJECTION, SOLUTION INTRAMUSCULAR; INTRAVENOUS at 01:37

## 2020-01-01 RX ADMIN — ALBUTEROL SULFATE 2.5 MG: 2.5 SOLUTION RESPIRATORY (INHALATION) at 15:56

## 2020-01-01 RX ADMIN — ALBUTEROL SULFATE 2.5 MG: 2.5 SOLUTION RESPIRATORY (INHALATION) at 00:14

## 2020-01-01 RX ADMIN — METHYLPREDNISOLONE SODIUM SUCCINATE 125 MG: 125 INJECTION, POWDER, FOR SOLUTION INTRAMUSCULAR; INTRAVENOUS at 12:07

## 2020-01-01 RX ADMIN — AZITHROMYCIN MONOHYDRATE 500 MG: 500 INJECTION, POWDER, LYOPHILIZED, FOR SOLUTION INTRAVENOUS at 14:22

## 2020-01-01 RX ADMIN — DORZOLAMIDE HCL 1 DROP: 20 SOLUTION/ DROPS OPHTHALMIC at 21:14

## 2020-01-01 RX ADMIN — MORPHINE SULFATE 1 MG: 2 INJECTION, SOLUTION INTRAMUSCULAR; INTRAVENOUS at 00:31

## 2020-01-01 RX ADMIN — IPRATROPIUM BROMIDE AND ALBUTEROL SULFATE 3 ML: .5; 3 SOLUTION RESPIRATORY (INHALATION) at 15:29

## 2020-01-01 RX ADMIN — METHYLPREDNISOLONE SODIUM SUCCINATE 62.5 MG: 125 INJECTION, POWDER, FOR SOLUTION INTRAMUSCULAR; INTRAVENOUS at 16:06

## 2020-01-01 RX ADMIN — PIPERACILLIN SODIUM AND TAZOBACTAM SODIUM 4.5 G: 4; .5 INJECTION, POWDER, LYOPHILIZED, FOR SOLUTION INTRAVENOUS at 05:26

## 2020-01-01 RX ADMIN — DORZOLAMIDE HCL 1 DROP: 20 SOLUTION/ DROPS OPHTHALMIC at 08:22

## 2020-01-01 RX ADMIN — SODIUM CHLORIDE 1000 ML: 9 INJECTION, SOLUTION INTRAVENOUS at 14:45

## 2020-01-01 RX ADMIN — SODIUM CHLORIDE 1000 ML: 9 INJECTION, SOLUTION INTRAVENOUS at 21:02

## 2020-01-01 RX ADMIN — ALBUMIN HUMAN 12.5 G: 0.05 INJECTION, SOLUTION INTRAVENOUS at 11:19

## 2020-01-01 RX ADMIN — IPRATROPIUM BROMIDE AND ALBUTEROL SULFATE 3 ML: .5; 3 SOLUTION RESPIRATORY (INHALATION) at 07:32

## 2020-01-01 RX ADMIN — SODIUM CHLORIDE 1000 ML: 9 INJECTION, SOLUTION INTRAVENOUS at 18:15

## 2020-01-01 RX ADMIN — PIPERACILLIN SODIUM AND TAZOBACTAM SODIUM 4.5 G: 4; .5 INJECTION, POWDER, LYOPHILIZED, FOR SOLUTION INTRAVENOUS at 10:12

## 2020-01-01 RX ADMIN — ALBUTEROL SULFATE 2.5 MG: 2.5 SOLUTION RESPIRATORY (INHALATION) at 15:55

## 2020-01-01 RX ADMIN — VANCOMYCIN HYDROCHLORIDE 1000 MG: 1 INJECTION, SOLUTION INTRAVENOUS at 16:31

## 2020-01-01 RX ADMIN — MORPHINE SULFATE 2 MG: 2 INJECTION, SOLUTION INTRAMUSCULAR; INTRAVENOUS at 16:42

## 2020-01-01 RX ADMIN — PIPERACILLIN SODIUM AND TAZOBACTAM SODIUM 4.5 G: 4; .5 INJECTION, POWDER, LYOPHILIZED, FOR SOLUTION INTRAVENOUS at 18:06

## 2020-01-01 RX ADMIN — ALBUTEROL SULFATE: 2.5 SOLUTION RESPIRATORY (INHALATION) at 15:41

## 2020-01-01 RX ADMIN — CEFTRIAXONE SODIUM 2 G: 2 INJECTION, POWDER, FOR SOLUTION INTRAMUSCULAR; INTRAVENOUS at 14:02

## 2020-01-01 RX ADMIN — PIPERACILLIN SODIUM AND TAZOBACTAM SODIUM 3.38 G: 3; .375 INJECTION, POWDER, LYOPHILIZED, FOR SOLUTION INTRAVENOUS at 17:23

## 2020-01-01 RX ADMIN — LORAZEPAM 0.5 MG: 0.5 TABLET ORAL at 19:08

## 2020-01-01 RX ADMIN — ALBUTEROL SULFATE 6 PUFF: 108 AEROSOL, METERED RESPIRATORY (INHALATION) at 12:07

## 2020-01-01 RX ADMIN — PIPERACILLIN SODIUM AND TAZOBACTAM SODIUM 4.5 G: 4; .5 INJECTION, POWDER, LYOPHILIZED, FOR SOLUTION INTRAVENOUS at 00:02

## 2020-01-01 RX ADMIN — METOPROLOL TARTRATE 2.5 MG: 5 INJECTION INTRAVENOUS at 06:54

## 2020-01-01 RX ADMIN — SODIUM CHLORIDE: 9 INJECTION, SOLUTION INTRAVENOUS at 03:33

## 2020-01-01 RX ADMIN — VANCOMYCIN HYDROCHLORIDE 750 MG: 1 INJECTION, POWDER, LYOPHILIZED, FOR SOLUTION INTRAVENOUS at 15:40

## 2020-01-01 RX ADMIN — HALOPERIDOL LACTATE 2 MG: 5 INJECTION INTRAMUSCULAR at 01:52

## 2020-01-01 RX ADMIN — ALBUMIN HUMAN 12.5 G: 0.05 INJECTION, SOLUTION INTRAVENOUS at 22:38

## 2020-01-01 RX ADMIN — MORPHINE SULFATE 2 MG: 2 INJECTION, SOLUTION INTRAMUSCULAR; INTRAVENOUS at 06:45

## 2020-01-01 ASSESSMENT — ACTIVITIES OF DAILY LIVING (ADL)
ADLS_ACUITY_SCORE: 23
ADLS_ACUITY_SCORE: 27
ADLS_ACUITY_SCORE: 23
ADLS_ACUITY_SCORE: 27
ADLS_ACUITY_SCORE: 29
ADLS_ACUITY_SCORE: 29
ADLS_ACUITY_SCORE: 23
ADLS_ACUITY_SCORE: 27
ADLS_ACUITY_SCORE: 23
ADLS_ACUITY_SCORE: 23
ADLS_ACUITY_SCORE: 29
ADLS_ACUITY_SCORE: 29
ADLS_ACUITY_SCORE: 27

## 2020-01-01 ASSESSMENT — ENCOUNTER SYMPTOMS
FEVER: 0
SHORTNESS OF BREATH: 1
FATIGUE: 1

## 2020-01-01 ASSESSMENT — MIFFLIN-ST. JEOR: SCORE: 893.5

## 2020-01-09 NOTE — TELEPHONE ENCOUNTER
"Requested Prescriptions   Pending Prescriptions Disp Refills     budesonide-formoterol (SYMBICORT) 160-4.5 MCG/ACT Inhaler 1 Inhaler 11     Sig: INHALE TWO PUFFS BY MOUTH TWICE DAILY   Last Written Prescription Date:  6/3/19  Last Fill Quantity: 1,  # refills: 11   Last office visit: 11/26/2019 with prescribing provider:     Future Office Visit:        Inhaled Steroids Protocol Passed - 1/9/2020  4:14 PM        Passed - Patient is age 12 or older        Passed - Recent (12 mo) or future (30 days) visit within the authorizing provider's specialty     Patient has had an office visit with the authorizing provider or a provider within the authorizing providers department within the previous 12 mos or has a future within next 30 days. See \"Patient Info\" tab in inbasket, or \"Choose Columns\" in Meds & Orders section of the refill encounter.              Passed - Medication is active on med list          "

## 2020-01-28 NOTE — TELEPHONE ENCOUNTER
Called and left number to call back.    .Shanae RACHEL    [facility] [place]

## 2020-01-28 NOTE — TELEPHONE ENCOUNTER
Paulina from [facility] is calling because they are needing her prescription from Nov for oxygen and oxygen saturation levels for the visit in November as well.   FAX: [phone]

## 2020-02-03 NOTE — TELEPHONE ENCOUNTER
Last appointment:  1-18-19.  No future appointments scheduled.  Patient did not schedule an appointment when she was requested to make one in November.    Dr. Amos approves one year of refills of Latanoprost.  Message added to refill requesting her to schedule an appointment with Dr. Amos.  Also, our  will attempt to contact the patient to schedule an appointment.

## 2020-03-12 NOTE — TELEPHONE ENCOUNTER
"Requested Prescriptions   Pending Prescriptions Disp Refills     budesonide-formoterol (SYMBICORT) 160-4.5 MCG/ACT Inhaler 1 Inhaler 3     Sig: INHALE TWO PUFFS BY MOUTH TWICE DAILY   Last Written Prescription Date:  1/13/20  Last Fill Quantity: 1,  # refills: 3   Last office visit: 11/26/2019 with prescribing provider:     Future Office Visit:        Long-Acting Beta Agonist Inhalers Protocol  Failed - 3/12/2020 10:55 AM        Failed - Order for Serevent, Striverdi, or Foradil and pt has steroid inhaler        Passed - Patient is age 12 or older        Passed - Recent (12 mo) or future (30 days) visit within the authorizing provider's specialty     Patient has had an office visit with the authorizing provider or a provider within the authorizing providers department within the previous 12 mos or has a future within next 30 days. See \"Patient Info\" tab in inbasket, or \"Choose Columns\" in Meds & Orders section of the refill encounter.              Passed - Medication is active on med list       Inhaled Steroids Protocol Passed - 3/12/2020 10:55 AM        Passed - Patient is age 12 or older        Passed - Recent (12 mo) or future (30 days) visit within the authorizing provider's specialty     Patient has had an office visit with the authorizing provider or a provider within the authorizing providers department within the previous 12 mos or has a future within next 30 days. See \"Patient Info\" tab in inbasket, or \"Choose Columns\" in Meds & Orders section of the refill encounter.              Passed - Medication is active on med list             "

## 2020-03-17 NOTE — TELEPHONE ENCOUNTER
Prior Authorization Retail Medication Request    Medication/Dose: Budesonide/form  ICD code (if different than what is on RX):    Previously Tried and Failed:    Rationale:      Insurance Name:  [employer]   Insurance ID:  [id number]      Pharmacy Information (if different than what is on RX)  Name:  [facility]  Phone:  [phone]

## 2020-03-18 NOTE — TELEPHONE ENCOUNTER
Prior Authorization Not Needed per Insurance    Medication: Budesonide/form  Insurance Company: [employer] - Phone [phone] Fax [phone]  Expected CoPay:      Pharmacy Filling the Rx: [id number] - [place] - [address] AT [facility] [address]  Pharmacy Notified: Yes  Patient Notified: Yes

## 2020-03-19 NOTE — TELEPHONE ENCOUNTER
"Requested Prescriptions   Pending Prescriptions Disp Refills     atenolol (TENORMIN) 25 MG tablet 180 tablet 0     Sig: Take 2 tablets (50 mg) by mouth daily   Last Written Prescription Date:  12-20-19  Last Fill Quantity: 180,  # refills: 0   Last office visit: 11/26/2019 with prescribing provider:  6-3-19   Future Office Visit:        Beta-Blockers Protocol Passed - 3/19/2020  9:53 AM        Passed - Blood pressure under 140/90 in past 12 months     BP Readings from Last 3 Encounters:   11/26/19 118/74   06/03/19 138/78   10/22/18 120/73                 Passed - Patient is age 6 or older        Passed - Recent (12 mo) or future (30 days) visit within the authorizing provider's specialty     Patient has had an office visit with the authorizing provider or a provider within the authorizing providers department within the previous 12 mos or has a future within next 30 days. See \"Patient Info\" tab in inbasket, or \"Choose Columns\" in Meds & Orders section of the refill encounter.              Passed - Medication is active on med list             "

## 2020-05-08 NOTE — TELEPHONE ENCOUNTER
"Requested Prescriptions   Pending Prescriptions Disp Refills     simvastatin (ZOCOR) 20 MG tablet 90 tablet 3     Sig: TAKE ONE TABLET BY MOUTH ONCE DAILY AT BEDTIME.     Last Written Prescription Date:  6/3/19  Last Fill Quantity: 90,  # refills: 3   Last office visit: 6/3/2019 with prescribing provider:  6/3/19   Future Office Visit:              Statins Protocol Passed - 5/7/2020 10:14 AM        Passed - LDL on file in past 12 months     Recent Labs   Lab Test 06/03/19  1603   LDL 46             Passed - No abnormal creatine kinase in past 12 months     No lab results found.             Passed - Recent (12 mo) or future (30 days) visit within the authorizing provider's specialty     Patient has had an office visit with the authorizing provider or a provider within the authorizing providers department within the previous 12 mos or has a future within next 30 days. See \"Patient Info\" tab in inbasket, or \"Choose Columns\" in Meds & Orders section of the refill encounter.              Passed - Medication is active on med list        Passed - Patient is age 18 or older        Passed - No active pregnancy on record        Passed - No positive pregnancy test in past 12 months           Prescription approved per [facility] Refill Protocol.    Janel Lainez RN  [facility]      "

## 2020-06-15 NOTE — TELEPHONE ENCOUNTER
Reason for Call:  Medication or medication refill:    Do you use a [facility]?  Name of the pharmacy and phone number for the current request:  [facility]    Name of the medication requested: Multiple    Other request: Please fill all these at [facility] now    Can we leave a detailed message on this number? YES    Phone number patient can be reached at: Home number on file [phone] (home), Lidia, daughter in law. No consent on file    Best Time: any    Call taken on 6/15/2020 at 2:48 PM by Giovanna Deshpande

## 2020-06-16 NOTE — TELEPHONE ENCOUNTER
Prescription approved per [facility] Refill Protocol for Atenolol.       Routing refill request to provider for review/approval because:  Failed protocol:   Long-Acting Beta Agonist Inhalers Protocol Luqczc33/15/2020 02:58 PM   Order for Serevent, Striverdi, or Foradil and pt has steroid inhaler     Labs not current: LDL  PHARMACY CHANGE      Last Written Prescription Date:  9/4/18  Last Fill Quantity: 3 bottle,  # refills: 4   Last office visit: 11/26/2019 with prescribing provider:    Future Office Visit:         dorzolamide (TRUSOPT) 2 % ophthalmic solution 3 Bottle 4     Sig: Place 1 drop into both eyes 2 times daily       There is no refill protocol information for this order   Last Written Prescription Date:  2/3/2020  Last Fill Quantity: 2.5 ml,  # refills: 11   Last office visit: 11/26/2019 with prescribing provider:     Future Office Visit:  PHARMACY CHANGE       latanoprost (XALATAN) 0.005 % ophthalmic solution 2.5 mL 11     Sig: Place 1 drop into both eyes At Bedtime       There is no refill protocol information for this order   Last Written Prescription Date:  3/16/2020  Last Fill Quantity: 1 inhaler,  # refills: 3 refills   Last office visit: 11/26/2019 with prescribing provider:    Future Office Visit:  PHARMACY CHANGE       budesonide-formoterol (SYMBICORT) 160-4.5 MCG/ACT Inhaler 1 Inhaler 3     Sig: INHALE TWO PUFFS BY MOUTH TWICE DAILY       Long-Acting Beta Agonist Inhalers Protocol  Failed - 6/15/2020  2:58 PM        Failed - Order for Serevent, Striverdi, or Foradil and pt has steroid inhaler       Ashley España RN, BSN  [facility]

## 2020-06-17 NOTE — TELEPHONE ENCOUNTER
Reason for Call:  Other prescription    Detailed comments: Pt's daughter in law, Kely, calling (NO CTC). States Symbicort is coming in the mail through [facility], but since the pt is out they are wondering if they can pick something up at a local pharmacy in the meantime since the Pt needs it.     Kely is aware there is no CTC on file, so we will have to call the Pt back.    Would like to go to [facility] in [place] [address].    Phone Number Patient can be reached at: Cell number on file:    Telephone Information:   Mobile [phone] Pt's number       Best Time: any    Can we leave a detailed message on this number? YES    Call taken on 6/17/2020 at 11:41 AM by Holley Mercer

## 2020-06-17 NOTE — TELEPHONE ENCOUNTER
Confirmed with [facility] on [address] that they do not have a current prescription for Symbicort on file for this patient.  Confirmed with the patient that she wants 1 refill sent to local pharmacy.  Patient requesting 1 time refill to local pharmacy due to patient is out of medication.  Anna Almodovar RN

## 2020-06-17 NOTE — TELEPHONE ENCOUNTER
Left a detailed message that medication Symbicort has been sent to [id number] - [place], [place] - [address] AT [facility] [address] [place] [address]     Ashley España RN, BSN  [facility]

## 2020-06-18 NOTE — TELEPHONE ENCOUNTER
Routing to team to inform and assist with scheduling. Thank you very much.     Aslhey España RN, BSN  [facility] [place]

## 2020-07-06 NOTE — TELEPHONE ENCOUNTER
Forms received from: [employer]   Phone number listed: [phone]   Fax listed: [phone]  Date received: 07/06/2020  Form description: oxygen [employer]  Once forms are completed, please return to [employer] via fax.  Form placed: in providers folder  Maria R Jiang

## 2020-07-08 NOTE — TELEPHONE ENCOUNTER
Requested information faxed to number provided.  [facility]  Team 3 Coordinator

## 2020-08-03 NOTE — TELEPHONE ENCOUNTER
Attempted to call patient at mobile number (again home number mailbox is full), left message on voicemail; patient was instructed to return call to [facility] RN directly on the RN call back line at [phone]   Janel Lainez, RN  [facility]     Consent: The patient's consent was obtained including but not limited to risks of crusting, scabbing, blistering, scarring, darker or lighter pigmentary change, recurrence, incomplete removal and infection. Render Note In Bullet Format When Appropriate: No Detail Level: Detailed Post-Care Instructions: I reviewed with the patient in detail post-care instructions. Patient is to wear sunprotection, and avoid picking at any of the treated lesions. Pt may apply Vaseline to crusted or scabbing areas. Duration Of Freeze Thaw-Cycle (Seconds): 0

## 2020-08-14 NOTE — TELEPHONE ENCOUNTER
Long-Acting Beta Agonist Inhalers Protocol [id number]/13/2020 09:37 PM   Order for Serevent, Striverdi, or Foradil and pt has steroid inhaler     Anna Almodovar RN

## 2020-09-14 NOTE — TELEPHONE ENCOUNTER
Regarding making an appointment for her glaucoma recheck...Jess called and Daughter stated pt will refuse to come in - stated she will work on it, but no appointment was scheduled.  Will refill per protocol/Dr. Amos and patient is high risk for COVID, Will call to schedule when she is ready.

## 2020-09-21 NOTE — LETTER
September 24, 2020      Jeb Palomino  [address] [place] [address] [address]   [place] [address]        Dear Jeb,    I care about your health and have reviewed your health plan. I have reviewed your medical conditions, medication list, and lab results and am making recommendations based on this review, to better manage your health.    You are in particular need of attention regarding:  -Wellness (Physical) Visit   -medications    I am recommending that you:  -Schedule an appointment    Here is a list of Health Maintenance topics that are due now or due soon:  Health Maintenance Due   Topic Date Due     Osteoporosis Screening  1935     Zoster (Shingles) Vaccine (1 of 2) 08/12/1986     Eye Exam  07/03/2019     PHQ-2  01/01/2020     Annual Wellness Visit  06/03/2020     Basic Metabolic Panel  06/03/2020     Cholesterol Lab  06/03/2020     FALL RISK ASSESSMENT  06/03/2020     Flu Vaccine (1) 09/01/2020       Please call us at [phone] (or use [employer]) to address the above recommendations.     Thank you for trusting [facility] and we appreciate the opportunity to serve you.  We look forward to supporting your healthcare needs in the future.    Healthy Regards,    Kelsi Marino MD

## 2020-09-22 NOTE — TELEPHONE ENCOUNTER
Failed ldl.  please route to  team if patient needs an appointment     Eliana CARRERORN BSN  [facility]  [phone]

## 2020-09-24 NOTE — TELEPHONE ENCOUNTER
Shanae Bolivar contacted Jeb on 09/24/20 and left a message. If patient calls back please schedule appointment as soon as possible. Letter sent.      .Shanae RACHEL    [facility] [place]

## 2020-09-28 PROBLEM — J96.00 ACUTE RESPIRATORY FAILURE (H): Status: ACTIVE | Noted: 2020-01-01

## 2020-09-28 NOTE — ED PROVIDER NOTES
History     Chief Complaint:  Shortness of Breath and Hypotension      HPI  Jeb Palomino is a 85 year old female with a history of COPD and HTN who presents to the emergency department from assisted living via EMS for evaluation of shortness of breath. Per EMS, assisted living staff noticed the patient had O2 sats in the 70s on 2L NC as well as being hypotensive with pressures of 75/58 and feeling lethargic overall. EMS was subsequently called. Patient has a history of alcohol abuse and EMS noted a large bottle of wine with a glass next to her. Patient's O2 was increased to 5L NC to improvement. Here she denies feeling short of breath. She also denies fever and chest pain.    Allergies:  No Known Drug Allergies    Medications:    Albuterol  Aspirin 81 mg  Symbicort  Vitamin D  Simvastatin  Tiotropium    Past Medical History:    Anxiety  HTN  Alcohol abuse  Strabismus  UTI  Hemiretinal artery occlusion   Tobacco use disorder  COPD  HLD  Glaucoma  Carotid stenosis  Emphysema     Past Surgical History:    Bilateral cataract extraction  Trabeculoplasty x2  Phacoemulsification clear cornea with intraocular lens implant, bilateral    Family History:    History reviewed. No pertinent family history.     Social History:  Smoking Status: Former Smoker  Alcohol Use: Yes  Drug Use: No  PCP: Jose Doyle   The patient presents to the emergency department via EMS.  Marital Status:     Review of Systems   Constitutional: Positive for fatigue. Negative for fever.   Respiratory: Positive for shortness of breath.    Cardiovascular: Negative for chest pain.   All other systems reviewed and are negative.    Physical Exam     Patient Vitals for the past 24 hrs:   BP Temp Temp src Pulse Resp SpO2   09/28/20 1530 (!) 69/52 -- -- 85 25 95 %   09/28/20 1502 (!) 78/58 -- -- 85 20 96 %   09/28/20 1435 (!) 86/73 -- -- 80 24 (!) 84 %   09/28/20 1430 (!) 82/63 -- -- 80 22 90 %   09/28/20 1425 (!) 83/39 -- -- 81 -- 90 %   09/28/20  1400 (!) 82/50 -- -- 194 -- 99 %   09/28/20 1324 (!) 103/91 -- -- 81 -- 100 %   09/28/20 1300 (!) 85/65 -- -- 82 25 99 %   09/28/20 1253 (!) 86/59 -- -- 76 26 92 %   09/28/20 1250 -- -- -- 81 22 --   09/28/20 1218 -- -- -- 81 (!) 99 92 %   09/28/20 1200 129/60 -- -- (!) 31 -- 93 %   09/28/20 1147 -- -- -- 86 -- --   09/28/20 1139 133/58 98.8  F (37.1  C) Oral -- 24 100 %         Physical Exam  General: Alert, interactive.   Head:  Scalp is atraumatic.  Eyes:  EOM intact. The pupils are equal, round, and reactive to light. No scleral icterus.   ENT:                                      Ears:  The external ears are normal.  Nose:  The external nose is normal.  Throat:  The oropharynx is normal. Mucus membranes are moist.                 Neck:  Normal range of motion. There is no rigidity.   CV:  Regular rate and rhythm. No murmur. 2+ radial pulses  Resp:  Diminished breath sounds, expiratory wheeze. Purse lip breathing.   GI:  Abdomen is soft, no distension, no tenderness.   MS:  Normal range of motion.   Skin:  Warm and dry.   Neuro:  Strength and sensation grossly intact.   Psych:  Awake. Alert.  Appropriate interactions.          Emergency Department Course   EKG  Indication: Shortness of Breath  Time: 11:49:04  Rate 86 bpm. CT interval 160. QRS duration 68. QT/QTc 352/421. P-R-T axes 62 18 70  Normal sinus rhythm. Nonspecific ST&T wave abnormality. Abnormal ECG.   Interpreted at 1156 by Dr. Lashawn Serrano MD.    Imaging:  Radiographic findings were communicated with the patient who voiced understanding of the findings.  XR Chest 1 view, portable:   IMPRESSION: Interval development of minimal infiltrate or increasing   atelectatic change in the right lung base adjacent to the   hemidiaphragm. The remainder of the lungs are clear. Previously   suggested nodular density in the right mid to lower lung zone is no   longer seen. Heart size is normal. Vascular calcifications. as per radiology.      Laboratory:  CBC:  WBC: 6.3, HGB: 11.3 (L), PLT: 224  BMP: Glucose 74, Sodium: 132 (L), Chloride: 93 (L), Carbon Dioxide: 39 (H), Anion Gap: <1 (L), o/w WNL (Creatinine: 0.67)  Hepatic Panel: Albumin: 2.2 (L), o/w Negative    Lactic acid (1518): 2.6 (H)  Lactic acid (1350): 2.4 (H)    Troponin (1156): <0.015  BNP: 5758 (H)  VBG and oxyhgb: pH 7.10 (LL) / PCO2 123 (HH) / PO2 25 / Bicarb 38 (H) / FlO2 11L / Oxyhemoglobin 33 / Base excess 4.7  Alcohol ethyl: <0.01    Symptomatic COVID-19 PCR: Pending  COVID-19 RT-PCR: Pending  Blood Cultures x2: Pending    Interventions:  1207 albuterol 6 puffs  1207 solu-medrol 125 mg IV  1402 Rocephin 2 g IV    Emergency Department Course:  Nursing notes and vitals reviewed. (1146) I performed an exam of the patient as documented above.     IV inserted. Medicine administered as documented above. Blood drawn. COVID swab obtained. This was sent to the lab for further testing, results above.     The patient was sent for a xray while in the emergency department, findings above.     An EKG was recorded. Results as noted above.     1345 I rechecked the patient and discussed the results of her workup thus far.     1405 I transferred care of the patient with Dr. Ronak MD. Please see his note for further details.     Impression & Plan      Medical Decision Making:  Jeb Palomino is a 85 year old female with medical history including COPD on 2 L of oxygen at assisted living facility, presents emergency department with hypoxia.  On my initial examination, patient was alert and oriented.  diffuse wheezing noted on lung ausculation. Patient was on 5 L of O2 and oxygen saturations 90-95s.  Patient's blood pressures stable initially and above work-up initiated.  Initial lactic elevated 2.4.  VBG reveals findings consistent with respiratory acidosis.  EKG without acute ischemic changes and troponin negative.  I doubt acute coronary syndrome. COPD exacerbation and albuterol and Solu medrol given. Chest x-ray  reveals interval infiltrate to the right lung base, Rocephin given in the emergency department.  Patient's blood pressure trended down.  I shared service with Dr. Simons who discussed plan with patient's family.  Patient's son reports patient is DNR/DNI.  Patient started on BiPAP and admitted to [facility] for further care and monitoring.    Covid-19  Jeb Palomino was evaluated during a global COVID-19 pandemic, which necessitated consideration that the patient might be at risk for infection with the SARS-CoV-2 virus that causes COVID-19.   Applicable protocols for evaluation were followed during the patient's care.   COVID-19 was considered as part of the patient's evaluation. The plan for testing is:  a test was obtained during this visit.    Diagnosis:    ICD-10-CM    1. Acute on chronic respiratory failure with hypoxia (H)  J96.21 Symptomatic COVID-19 Virus (Coronavirus) by PCR     Hepatic panel     Nt probnp inpatient     Blood gas venous and oxyhgb     Lactic acid     SARS-CoV-2 COVID-19 Virus (Coronavirus) RT-PCR     SARS-CoV-2 COVID-19 Virus (Coronavirus) RT-PCR   2. Pneumonia due to infectious organism, unspecified laterality, unspecified part of lung  J18.9    3. COPD exacerbation (H)  J44.1    4. Septic shock (H)  A41.9     R65.21        Disposition:  Admitted to the [facility]    Paulino Diaz  9/28/2020   EMERGENCY DEPARTMENT  Scribe Disclosure:  I, Paulino Diaz, am serving as a scribe at 11:46 AM on 9/28/2020 to document services personally performed by Jessica Low PA-C based on my observations and the provider's statements to me.          Jessica Low PA-C  09/28/20 5547

## 2020-09-28 NOTE — ED NOTES
DATE:  9/28/2020   TIME OF RECEIPT FROM LAB:  9934  LAB TEST:  Venous PH 7.1 and PCO2  123  LAB VALUE:  See above  RESULTS GIVEN WITH READ-BACK TO (PROVIDER): Devante CRANE LAB VALUE REPORTED TO PROVIDER:   6797

## 2020-09-28 NOTE — PHARMACY-ADMISSION MEDICATION HISTORY
Pharmacy Medication History  Admission medication history interview status for the 9/28/2020  admission is complete. See EPIC admission navigator for prior to admission medications     Medication history sources: Patient's home med list and Care Everywhere  Medication history source reliability: Moderate  Adherence assessment: unknown    Significant changes made to the medication list:  None.  Unable to speak to patient so last doses left blank.  Recent fills on all rx meds.  Added furosemide as recent fill.      Additional medication history information:        Medication reconciliation completed by provider prior to medication history? No    Time spent in this activity: 15min      Prior to Admission medications    Medication Sig Last Dose Taking? Auth Provider   albuterol (2.5 MG/3ML) 0.083% neb solution Take 1 vial (2.5 mg) by nebulization every 4 hours as needed for shortness of breath / dyspnea prn Yes Jose Doyle MD   albuterol (PROAIR HFA) 108 (90 Base) MCG/ACT inhaler INHALE TWO PUFFS BY MOUTH EVERY 6 HOURS AS NEEDED FOR  SHORTNESS  OF  BREATH  /  DYSPNEA prn Yes Jose Doyle MD   aspirin 81 MG EC tablet Take 1 tablet (81 mg) by mouth daily  Yes Placido Brown MD   atenolol (TENORMIN) 25 MG tablet Take 2 tablets (50 mg) by mouth daily  Yes Benjamín Arroyo MD   budesonide-formoterol (SYMBICORT) 160-4.5 MCG/ACT Inhaler INHALE TWO PUFFS BY MOUTH TWICE DAILY  Yes Kelsi Marino MD   budesonide-formoterol (SYMBICORT) 160-4.5 MCG/ACT Inhaler USE 2 INHALATIONS TWICE A DAY  Yes Drew Padron MD   Cholecalciferol (VITAMIN D) 2000 UNITS CAPS Take  by mouth daily.  Yes Reported, Patient   dorzolamide (TRUSOPT) 2 % ophthalmic solution Place 1 drop into both eyes 2 times daily  Yes Caly Amos MD   furosemide (LASIX) 20 MG tablet Take 20 mg by mouth daily  Yes Unknown, Entered By History   latanoprost (XALATAN) 0.005 % ophthalmic solution Place 1 drop into both eyes At Bedtime  Yes  Clay Amos MD   MULTI VIT/FL OR 1 TABLET DAILY  Yes Reported, Patient   simvastatin (ZOCOR) 20 MG tablet TAKE 1 TABLET DAILY AT BEDTIME  Yes Drew Padron MD   tiotropium (SPIRIVA HANDIHALER) 18 MCG inhaled capsule INHALE ONE DOSE BY MOUTH ONCE DAILY  Yes Jose Doyle MD   order for DME Equipment being ordered: Oxygen.  Oxygen at 2 liters per nasal cannula or to be titrated to keep SaO2 > 90%.  12/31/12 testing showed SaO2 90% room air rest, 76% room air with activity, 88% 2lpm continuous flow with activity.   Jose Doyle MD   ORDER FOR DME Equipment being ordered: Vision Air machine and all associated supplies    Patient is on home oxygen   Jose Doyle MD

## 2020-09-28 NOTE — ED NOTES
Bed: ED28  Expected date:   Expected time:   Means of arrival:   Comments:  448  85 F low BP/lethargic 86/60/no covid  1126

## 2020-09-28 NOTE — ED PROVIDER NOTES
Emergency Department Attending Supervision Note  9/28/2020  1:59 PM      I evaluated this patient in conjunction with Jessica Low PA-C      Briefly, the patient presented for evaluation of shortness of breath. The patient's assisted living staff noticed she had low oxygen saturations in the 70s on her baseline 2L nasal cannula earlier today. She was also hypotensive and had increased lethargy.  Patient is a poor historian and is unable to provide further significant details regarding her presenting symptoms during my attempt interview her.    On my exam, she appears ill and is semi-recumbent with BiPAP in place, in room 28.  Most recent blood pressures in the 80s.  She has slight expiratory wheezing with respiratory rate in the low to mid 20s on BiPAP, oxygen saturation in the mid 90s on 40% FiO2.  She moves all extremities spontaneously.      Results:    XR Chest Port 1 View   IMPRESSION: Interval development of minimal infiltrate or increasing atelectatic change in the right lung base adjacent to the hemidiaphragm. The remainder of the lungs are clear. Previously suggested nodular density in the right mid to lower lung zone is no longer seen. Heart size is normal. Vascular calcifications.  Reading per radiology.      CBC: HGB 11.3 (L) o/w WNL (WBC 6.3, )  BMP:  (L), Chloride 93 (L), Carbon dioxide 39 (H), Anion gap <1 (L) o/w WNL (Creatinine 0.67)   Hepatic panel: Albumin 2.2 (L) o/w WNL  Troponin (Collected 1156): <0.015   Lactic Acid (Collected at 1350): 2.4 (H)   Lactic Acid (Collected at 1518): 2.6 (H)   Alcohol ethyl: <0.01  Blood Culture x2: Pending     Blood gas venous and oxyhgb (Collected 1350): pH Venous 7.10 (LL), PCO2 Venous 123 (HH), PO2 Venous 25, Bicarbonate venous 38 (H), FIO2 11 L, Oxyhemoglobin Venous 33, Base Excess Venous 4.7    Blood gas venous and oxyhgb (Collected 1518): pH Venous 7.09 (LL), PCO2 Venous 112 (HH), PO2 Venous 31, Bicarbonate venous 34 (H), FIO2 40, Oxyhemoglobin  Venous 47, Base Excess Venous 1.9      COVID-19 Virus (Coronavirus) by PCR: Pending.       Patient Vitals for the past 24 hrs:   BP Temp Temp src Pulse Resp SpO2   09/28/20 1730 (!) 81/73 -- -- 88 23 95 %   09/28/20 1720 (!) 81/59 -- -- 89 25 96 %   09/28/20 1710 (!) 86/55 -- -- 89 24 96 %   09/28/20 1700 95/60 -- -- 88 24 95 %   09/28/20 1650 (!) 85/55 -- -- 90 (!) 0 95 %   09/28/20 1645 (!) 82/51 -- -- 88 24 96 %   09/28/20 1630 (!) 78/56 -- -- 125 28 95 %   09/28/20 1622 99/48 -- -- -- -- --   09/28/20 1620 99/48 -- -- 86 23 96 %   09/28/20 1615 (!) 74/59 -- -- 84 28 97 %   09/28/20 1610 (!) 74/59 -- -- 86 26 98 %   09/28/20 1600 (!) 84/45 -- -- 84 19 98 %   09/28/20 1550 (!) 69/50 -- -- 85 21 95 %   09/28/20 1540 (!) 78/56 -- -- 84 27 95 %   09/28/20 1530 (!) 69/52 -- -- 85 25 95 %   09/28/20 1502 (!) 78/58 -- -- 85 20 96 %   09/28/20 1500 (!) 44/23 -- -- 86 9 97 %   09/28/20 1435 (!) 86/73 -- -- 80 24 (!) 84 %   09/28/20 1430 (!) 82/63 -- -- 80 22 90 %   09/28/20 1425 (!) 83/39 -- -- 81 -- 90 %   09/28/20 1400 (!) 82/50 -- -- 194 -- 99 %   09/28/20 1324 (!) 103/91 -- -- 81 -- 100 %   09/28/20 1300 (!) 85/65 -- -- 82 25 99 %   09/28/20 1253 (!) 86/59 -- -- 76 26 92 %   09/28/20 1250 -- -- -- 81 22 --   09/28/20 1218 -- -- -- 81 (!) 99 92 %   09/28/20 1200 129/60 -- -- (!) 31 -- 93 %   09/28/20 1147 -- -- -- 86 -- --   09/28/20 1139 133/58 98.8  F (37.1  C) Oral -- 24 100 %       ED course:    (1400)   Jessica Low PA-C shared service with me and discussed the patient's presentation, findings, and plan of care.     (4627)   I performed an exam on the patient, as documented above.    (0268)   I spoke with the patient's son, Carlos, regarding his mother's presentation, findings, and plan of care. I also discussed code status with her son, including possible intubation, which the son declined and states he would like his mother to be DNR/DNI.  We specifically confirmed that she would not go to the ICU under any  circumstances, as he would not want her to have a central line or pressors, intubation, or CPR.    (1511)   I rechecked the patient.     (1535)   I spoke with Dr. Day of the Hospitalist service regarding patient's presentation, findings, and plan of care.  He kindly agreed to accept care of the patient.     My impression is that she is clearly critically ill with hypotension and acute on chronic hypoxic and hypercarbic respiratory failure.  BiPAP has been initiated prior to my involvement in her care, including this needs to continue.  I spoke with her son Carlos, explained to him that she is very ill and, even with the most aggressive care, may not survive this illness.  Her son was very clear that the patient has a previous advanced directive stating that she is DNR, DNI.  Broad-spectrum antibiotics had already been initiated, and these were expanded to include coverage for possible aspiration as well, after discussion with accepting hospitalist and our ED clinical pharmacist.  She was treated with steroids as well as bronchodilators.  She was given several liters of IV fluid with minimal response in her leg pressure.  Repeat VBG shows improvement in her CO2 though minimal worsening of her pH.  I examined her multiple occasions and adjusted her BiPAP settings, coordinating her care with her nurse.  She was admitted to Dr. Day in critical condition.      Diagnosis    ICD-10-CM    1. Acute on chronic respiratory failure with hypoxia (H)  J96.21 Symptomatic COVID-19 Virus (Coronavirus) by PCR   2. Pneumonia due to infectious organism, unspecified laterality, unspecified part of lung  J18.9    3. COPD exacerbation (H)  J44.1    4. Septic shock (H)  A41.9     R65.21        Critical Care Time: Over 40 minutes, independent of procedures and independent of involvement by Jessica Low PA-C.  This included time spent obtaining a history and physical, reviewing the patient's chart, interpreting lab and imaging studies,  re-examining the patient, coordinating care with other providers, and documenting ED care provided.  She has life-threatening respiratory failure was evidence of septic shock with hypotension refractory to IV fluids.  Broad-spectrum antibiotics and aggressive respiratory support were provided and adjusted based on serial assessments during her ED course.  I would not be surprised if she dies in the near future from this illness.      Scribe Disclosure:  I, Santos Otero, am serving as a scribe at 1:59 PM on 9/28/2020 to document services personally performed by Prince Simons MD based on my observations and the provider's statements to me.        This note was completed in part using Dragon voice recognition software. Although reviewed after completion, some word and grammatical errors may occur.       Prince Simons MD  09/28/20 [id number]

## 2020-09-28 NOTE — ED NOTES
DATE:  9/28/2020   TIME OF RECEIPT FROM LAB:  7264  LAB TEST:  C02  & PH  LAB VALUE:  112  & 7.09  RESULTS GIVEN WITH READ-BACK TO (PROVIDER):  Prince Simons, *  TIME LAB VALUE REPORTED TO PROVIDER:   3285

## 2020-09-28 NOTE — ED NOTES
[facility]  ED Nurse Handoff Report    ED Chief complaint: Shortness of Breath and Hypotension      ED Diagnosis:   Final diagnoses:   Acute on chronic respiratory failure with hypoxia (H)   Pneumonia due to infectious organism, unspecified laterality, unspecified part of lung   COPD exacerbation (H)   Septic shock (H)       Code Status: DNR / DNI    Allergies:   Allergies   Allergen Reactions     No Known Drug Allergies        Patient Story: SOB, lethargic  Focused Assessment:  85 year old female with a history of COPD and HTN who presents to the emergency department from assisted living via EMS for evaluation of shortness of breath. Per EMS, assisted living staff noticed the patient had O2 sats in the 70s on 2L NC as well as being hypotensive with pressures of 75/58 and feeling lethargic overall. EMS was subsequently called. Patient has a history of alcohol abuse and EMS noted a large bottle of wine with a glass next to her. Patient was started on bipap while in the ED and has stated she is breathing easier and has become more alert.  Hypotension continues after 2 liters of fluids.  Treatment of iv fluids, bipap, and antibx.    Treatments and/or interventions provided: continues to be hypotensive  Patient's response to treatments and/or interventions: somewhat improved    To be done/followed up on inpatient unit:  monitor    Does this patient have any cognitive concerns?: x    Activity level - Baseline/Home:  Independent  Activity Level - Current:   Unknown    Patient's Preferred language: English   Needed?: No    Isolation: None and COVID r/o and special precautions  Infection: Not Applicable  COVID r/o and special precautions  Bariatric?: No    Vital Signs:   Vitals:    09/28/20 1425 09/28/20 1430 09/28/20 1435 09/28/20 1502   BP: (!) 83/39 (!) 82/63 (!) 86/73 (!) 78/58   Pulse: 81 80 80 85   Resp:  22 24 20   Temp:       TempSrc:       SpO2: 90% 90% (!) 84% 96%       Cardiac  Rhythm:Cardiac Rhythm: Normal sinus rhythm    Was the PSS-3 completed:   Yes  What interventions are required if any?               Family Comments: self  OBS brochure/video discussed/provided to patient/family: N/A              Name of person given brochure if not patient: x              Relationship to patient: x    For the majority of the shift this patient's behavior was Green.   Behavioral interventions performed were x.    ED NURSE PHONE NUMBER: [id number]

## 2020-09-28 NOTE — ED TRIAGE NOTES
Pt lives in an assisted living independently (minimal help from staff).  Staff was helping pt get her evy socks on and felt pt was lethargic, found O2 in the 70s on 2L NC, BP 75/58, SOB- hx of copd. Pt has a hx of ETOH abuse, per ems report pt had a large bottle of wine with a glass next to her. When asked if she was drinking pt laughed.  Family wants pt to be in a memory care center but pt refusing.

## 2020-09-28 NOTE — H&P
[facility]    History and Physical - Hospitalist Service       Date of Admission:  9/28/2020    Assessment & Plan   Jeb Palomino is a 85 year old female admitted on 9/28/2020.  Past history of oxygen dependent COPD, HTN, HLD, dementia who presents with shortness of breath, found to have acute on chronic respiratory failure with severe respiratory acidosis.       Acute on chronic hypoxic and hypercapnic respiratory failure  Oxygen dependent COPD (2L O2 chronically)  Presents with increased dyspnea, admission VBG with pH 7.09 and pCO2 of 112.  CXR with right basilar infiltrate vs atelectasis at right lung base.  Afebrile without leukocytosis though lactate mildly elevated at 2.5.  EKG and troponin were unremarkable, though Pro-BNP elevated at 5758.  She was placed on Bipap with slow improvement in pCO2 and marginal improvement in pH.    - continue vanco and zosyn  - check procal  - follow blood cultures  - symptomatic COVID swab pending; she denies any other symptoms currently  - would reassess prior to discontinuing precautions  - serial VBG and lactate  - repeat NS bolus 1L, then  ml/hr  - albumin 12.5g x1  - continue solumedrol 60 mg IV q8h  - duonebs q4h  - Bipap  - IMC monitoring  - severity of illness discussed with son Malcolm (who is POA), who confirmed DNR/DNI status and no pressors     Hypotension  Admission SBP 70-80's.   - treatment of possible sepsis as above    Hyponatremia  Admission Na 132.  - repeat in AM after fluid boluses above    Dementia  - oriented to self, date and knows she is in the hospital currently    HTN  - hold PTA atenolol due to hypotension    Glaucoma  - continue PTA eye drops    HLD  - resume PTA statin once taking PO       Diet: NPO  DVT Prophylaxis: Pneumatic Compression Devices  Canela Catheter: not present  Code Status: DNR/DNI per son  Rule Out COVID-19 Handoff:  Jeb is NOT A LOW SUSPICION PUI (needs further investigation).    Follow these  "instructions:    If COVID test is positive -> continue isolation precautions    If 1st COVID test is negative -> continue isolation precautions    -  Order a repeat COVID test to be done 72 hours after the 1st test  -  Place \"PUI Isolation\" nurse communication order  -  Consider ID consult    If 2nd COVID test performed after 72 hours is negative -> consider discontinuing COVID-specific isolation precautions if clinical course atypical for COVID and/or an alternative diagnosis emerges       Disposition Plan   Expected discharge: 4 - 7 days, recommended to prior living arrangement once respiratory failure resolved.  Entered: Nadir Day MD 09/28/2020, 5:00 PM     The patient's care was discussed with the Patient and Patient's Family.    Nadir Day MD  [facility]    ______________________________________________________________________    Chief Complaint   Shortness of breath    History is obtained from the patient, discussion with ED provider as well as family     History of Present Illness   Jeb Palomino is a 85 year old female who presents with shortness of breath.  She currently resides in assisted living, and on routine staff check today she is found to appear more short of breath.  Her oxygen levels were checked and found to be in the 70s and therefore EMS was called.  No further details are available.  She is currently seen while wearing BiPAP and she denies any shortness of breath, cough, fever chills, chest pain or pressure.  The interview was very difficult due to her being hard of hearing in addition to required PPE.  She denies any lightheadedness or any other symptoms and currently reports feeling at baseline.    Review of Systems    The 10 point Review of Systems is negative other than noted in the HPI or here.     Past Medical History    I have reviewed this patient's medical history and updated it with pertinent information if needed.   Past Medical History:   Diagnosis Date     " Allergic rhinitis due to other allergen      Anxiety state, unspecified 2005     Dysuria 20083     Elevated cholesterol      Glaucoma      HTN 2008     Other symptoms involving cardiovascular system 2009     Shortness of breath 2009     Strabismus      Tobacco use disorder 2004     Unspecified visual disturbance 2009     UTI 2008       Past Surgical History   I have reviewed this patient's surgical history and updated it with pertinent information if needed.  Past Surgical History:   Procedure Laterality Date     CATARACT IOL, RT/LT       COLONOSCOPY      Normal w/[facility]      LASER SELECTIVE TRABECULOPLASTY  3/2008    right eye temp 180     LASER SELECTIVE TRABECULOPLASTY  2016; 2018    left eye inf 180; left eye sup 180     PHACOEMULSIFICATION WITH STANDARD INTRAOCULAR LENS IMPLANT  3/2001; 2001    right eye; left eye       Social History   I have reviewed this patient's social history and updated it with pertinent information if needed.  Social History     Tobacco Use     Smoking status: Former Smoker     Types: Cigarettes     Last attempt to quit: 2009     Years since quittin.6     Smokeless tobacco: Never Used   Substance Use Topics     Alcohol use: Yes     Comment: Glass of wine daily     Drug use: No       Family History   I have reviewed this patient's family history and updated it with pertinent information if needed.  Family History   Problem Relation Age of Onset     Cardiovascular Father      Diabetes Other         cousin     Asthma Maternal Grandmother      Cerebrovascular Disease Maternal Grandmother      Breast Cancer Maternal Aunt      Cancer Maternal Aunt         ovarian late in life       Prior to Admission Medications   Prior to Admission Medications   Prescriptions Last Dose Informant Patient Reported? Taking?   Cholecalciferol (VITAMIN D) 2000 UNITS CAPS   Yes Yes   Sig: Take  by mouth daily.   MULTI VIT/FL OR   Yes Yes   Si  TABLET DAILY   ORDER FOR DME   No No   Sig: Equipment being ordered: Vision Air machine and all associated supplies    Patient is on home oxygen   albuterol (2.5 MG/3ML) 0.083% neb solution prn  No Yes   Sig: Take 1 vial (2.5 mg) by nebulization every 4 hours as needed for shortness of breath / dyspnea   albuterol (PROAIR HFA) 108 (90 Base) MCG/ACT inhaler prn  No Yes   Sig: INHALE TWO PUFFS BY MOUTH EVERY 6 HOURS AS NEEDED FOR  SHORTNESS  OF  BREATH  /  DYSPNEA   aspirin 81 MG EC tablet   Yes Yes   Sig: Take 1 tablet (81 mg) by mouth daily   atenolol (TENORMIN) 25 MG tablet   No Yes   Sig: Take 2 tablets (50 mg) by mouth daily   budesonide-formoterol (SYMBICORT) 160-4.5 MCG/ACT Inhaler   No Yes   Sig: USE 2 INHALATIONS TWICE A DAY   budesonide-formoterol (SYMBICORT) 160-4.5 MCG/ACT Inhaler   No Yes   Sig: INHALE TWO PUFFS BY MOUTH TWICE DAILY   dorzolamide (TRUSOPT) 2 % ophthalmic solution   No Yes   Sig: Place 1 drop into both eyes 2 times daily   furosemide (LASIX) 20 MG tablet   Yes Yes   Sig: Take 20 mg by mouth daily   latanoprost (XALATAN) 0.005 % ophthalmic solution   No Yes   Sig: Place 1 drop into both eyes At Bedtime   order for DME   No No   Sig: Equipment being ordered: Oxygen.  Oxygen at 2 liters per nasal cannula or to be titrated to keep SaO2 > 90%.  12/31/12 testing showed SaO2 90% room air rest, 76% room air with activity, 88% 2lpm continuous flow with activity.   simvastatin (ZOCOR) 20 MG tablet   No Yes   Sig: TAKE 1 TABLET DAILY AT BEDTIME   tiotropium (SPIRIVA HANDIHALER) 18 MCG inhaled capsule   No Yes   Sig: INHALE ONE DOSE BY MOUTH ONCE DAILY      Facility-Administered Medications: None     Allergies   Allergies   Allergen Reactions     No Known Drug Allergies        Physical Exam   Vital Signs: Temp: 98.8  F (37.1  C) Temp src: Oral BP: 99/48 Pulse: 125   Resp: 28 SpO2: 95 % O2 Device: Oxymask Oxygen Delivery: 6 LPM  Weight: 0 lbs 0 oz    General Appearance: Well-nourished elderly female no  acute distress  Eyes: Pupils equal, round reactive to light, sclera anicteric  HEENT: Mucous membranes dry, no neck lymphadenopathy  Respiratory: Diminished lung sounds throughout, no appreciable wheezes or crackles, no tachypnea on BiPAP  Cardiovascular: Distant heart sounds, regular rate and rhythm, normal S1/S2, no murmurs  GI: Abdomen soft, nontender, nondistended, normal bowel sounds  Lymph/Hematologic: No peripheral edema  Skin: Mild erythema of bilateral lower extremities and feet which does not appear grossly cellulitic  Musculoskeletal: Distal extremities cool to touch but appearing well perfused  Neurologic: Alert, oriented to person and date, knows she is in a hospital, cranial nerves grossly intact  Psychiatric: Unable to assess    Data   Data reviewed today: I reviewed all medications, new labs and imaging results over the last 24 hours. I personally reviewed the EKG tracing showing Normal sinus rhythm without ischemic changes and the chest x-ray image(s) showing Mild right basilar infiltrate versus atelectasis.    Recent Labs   Lab 09/28/20  1156   WBC 6.3   HGB 11.3*         *   POTASSIUM 5.3   CHLORIDE 93*   CO2 39*   BUN 11   CR 0.67   ANIONGAP <1*   BILLY 9.1   GLC 74   ALBUMIN 2.2*   PROTTOTAL 7.2   BILITOTAL 0.4   ALKPHOS 135   ALT 14   AST 42   TROPI <0.015     Recent Results (from the past 24 hour(s))   XR Chest Port 1 View    Narrative    CHEST ONE VIEW PORTABLE  9/28/2020 12:45 PM     HISTORY: Shortness of breath and history of COPD.    COMPARISON: 12/31/2012.      Impression    IMPRESSION: Interval development of minimal infiltrate or increasing  atelectatic change in the right lung base adjacent to the  hemidiaphragm. The remainder of the lungs are clear. Previously  suggested nodular density in the right mid to lower lung zone is no  longer seen. Heart size is normal. Vascular calcifications.    ADRIANNA LI MD

## 2020-09-29 NOTE — PROGRESS NOTES
Hospitalist update note    Paged by RN staff regarding patient being less responsive.  She had been refusing BiPAP mask but after repeated loud verbal encouragement patient is agreeable.  Have also ordered a stat ABG.  I will continue trying to reach her son via telephone to relay what was conveyed to him last night that I am very concerned with his mother's current condition and prognosis.    I called 3 times and never got an answer and therefore have left a voicemail urging a call back given his mother's very poor status at this time.     Discussed with RN.      4666  Was able to reach patient's daughter-in-law Lidia (Carlos's wife) who was aware of the patient's admission.  Lidia reports tries in a meeting.  I provided updates and concerns with her status.  Explained that we do not have clear evidence of pneumonia but that she is being treated with very strong broad-spectrum antibiotics as well as steroids for essentially COPD exacerbation.  I also explained that initial COVID testing was negative but that she is not low suspicion and therefore will be tested again.  I relayed to Lidia that I am attempting to get the patient to wear BiPAP mask again in hopes that she improves.  Lidia was aware of the gravity of the situation and confirmed that the patient is a DNR/DNI.    Will update RN staff.      1420  Son Carlos called into obs unit, we spoke over the phone and I gave him updates as aforementioned. I also discussed with Charge RN and bedside RN to call Carlos with any changes overnight.  If she appears to be deteriorating rapidly at least one family visitor should be warranted in that case.    Plan will be covid retest midday tomorrow.

## 2020-09-29 NOTE — PROVIDER NOTIFICATION
MD Notification    Notified Person: MD    Notified Person Name: Barb MD    Notification Date/Time: 9/28 2145    Notification Interaction: amcom page    Purpose of Notification: OBS 13-1 L.L.    pts BP 88/31, MAP 38. asymptomatic. fluid bolus running. any other intervention?    *[id number] PLACIDO Longoria    Orders Received: IV albumin    Comments:

## 2020-09-29 NOTE — PROVIDER NOTIFICATION
Brief update:    Page regarding new A. fib RVR with heart rates in the 120s    Patient is critically ill, not necessarily unexpected in this setting    EKG now  CMP, mag, phosphorus levels  Defer timing of TTE to rounding team.  Patient is currently on isolation for rule out COVID-19.  TTE to evaluate for valvular A. fib would not necessarily need to take place prior to COVID rule out, though uncertain if there is a desire for diagnostic assessment of cardiac function outside of atrial fibrillation given hypoxia and hypotension.    Puma Garcia MD  6:25 AM

## 2020-09-29 NOTE — PROVIDER NOTIFICATION
MD Notification    Notified Person: MD    Notified Person Name: Garcia, MD    Notification Date/Time: 9/29 0145    Notification Interaction: amcom page    Purpose of Notification: OBS 13-1 L.L.    pt ripped out IV, taking off BIPAP and tele. can we get something for behavior?    *[id number] Swati RN    Orders Received: haldol    Comments:

## 2020-09-29 NOTE — PLAN OF CARE
Alert and oriented to self and place. Up with two. Refusing to keep on Bipap MD aware. Sating well on 9L oxymask when able to get a good saturation BP hypotensive, respirations elevated up to 30, Pulse still rapid up to 130, temp WNL. No signs of pain except when BP cuff is going. Tele A fib RVR. Lactic 2.4 albumin ordered and fluids turned down to 75/hr.  Turn and repo. Has not voided since straight cath this AM. Will continue to monitor

## 2020-09-29 NOTE — PROVIDER NOTIFICATION
MD Notification    Notified Person: MD    Notified Person Name: MD London    Notification Date/Time: 9/29 0704    Notification Interaction: amcom page    Purpose of Notification: pt bladder scanned for 450mL. orders to straight cath?    Orders Received: straight cath per protocol    Comments:

## 2020-09-29 NOTE — PROGRESS NOTES
Patient on Bipap overnight. Mode switched to AVAPS mode due to low Ph and high CO2 on VBG. Waiting on follow up VBG results.     Cindy Townsend, RT

## 2020-09-29 NOTE — PROGRESS NOTES
RECEIVING UNIT ED HANDOFF REVIEW    ED Nurse Handoff Report was reviewed by: Swati Finch RN on September 28, 2020 at 7:57 PM

## 2020-09-29 NOTE — PROVIDER NOTIFICATION
MD Notification    Notified Person: MD    Notified Person Name:Dr. Callahan     Notification Date/Time:09/29/2020  1012    Notification Interaction:web paged.      Purpose of Notification:Lactic acid critical level of 2.4.       Comments: Watching for orders or call back.

## 2020-09-29 NOTE — PROVIDER NOTIFICATION
Brief update:    Paged regarding abnormal VBG.    Patient with a pH of 7.12, PCO2 of 93.  This is slightly improved from admission, though minimally so.    Request RT to reevaluate BiPAP settings.  Likely requires additional positive pressure support if able as still on 10/5.  Nursing to optimize patient positioning to attempt to optimize lung volumes; recorded volume still in the 200 range.  Acidosis may be contributing to hypotension.    Recheck VBG or ABG approximately 1 hour after adjustment of BiPAP settings    On IV Solu-Medrol    DNR/DNI with no pressors noted in chart

## 2020-09-29 NOTE — PROVIDER NOTIFICATION
MD Notification    Notified Person: MD    Notified Person Name: courtney,MD     Notification Date/Time: 9/29 0117    Notification Interaction: amcom page    Purpose of Notification: OBS 13-1 L.L.    see VBG results    *[id number] PLACIDO Longoria    Orders Received: have respiratory reassess bipap settings, release VBG order for one hour after    Comments:

## 2020-09-29 NOTE — PROGRESS NOTES
{ TIP  Improve documentation with new tip texts.  DO NOT DELETE TIPS. Once your note is signed tips will disappear. Learn more- Malnutrition tip sheet, Sepsis tip sheet, & QuickLearn videos                                    :[id number]}    [facility]    Medicine Progress Note - Hospitalist Service       Date of Admission:  9/28/2020  Assessment & Plan       Jeb Palomino is a 85 year old female admitted on 9/28/2020.  Past history of oxygen dependent COPD, HTN, HLD, dementia who presents with shortness of breath, found to have acute on chronic respiratory failure with severe respiratory acidosis.      Acute on chronic hypoxic and hypercapnic respiratory failure  Oxygen dependent COPD (2L O2 chronically)  Presents with increased dyspnea, admission VBG with pH 7.09 and pCO2 of 112.  CXR with right basilar infiltrate vs atelectasis at right lung base.  Afebrile without leukocytosis though lactate mildly elevated at 2.5.  EKG and troponin were unremarkable, though Pro-BNP elevated at 5758.  She was placed on Bipap with slow improvement in pCO2 and marginal improvement in pH.    - continue vanco and zosyn  - check procal  - follow blood cultures  - symptomatic COVID swab pending; she denies any other symptoms currently  - would reassess prior to discontinuing precautions  --- felt to be NOT low suspicion, will plan on retest 48-72 hrs after initial test  - serial VBG and lactate - bld gas improved this morning after bipap overnight  - repeat NS bolus 1L, then  ml/hr  - albumin 12.5g x1  - continue solumedrol 60 mg IV q8h  - duonebs q4h  - Bipap overnight, can trial off but may need back on, will discuss with patient and family this morning  - IMC monitoring  - on adm severity of illness discussed with son Malcolm (who is POA), who confirmed DNR/DNI status and no pressors   - doing a bit better this morning, on oxymask and she is asking for NC. Awakens and knows year, name, son's name. Overall still  guarded prognosis and attempted to talk with son Malcolm but he has not answered so far. Will try to contact via phone again later today.     Borderline sepsis/SIRS  Hypotension  Admission SBP 70-80's.  LA elevated 2.6, improved with IVF. WBC normal. RR >20, HR >90 at times although this is in context of apparently new af with rvr previously in adm.   - treatment of possible sepsis as above - broad spectrum abx given, IVF bolus and maintenance, cultures pending     Atrial fibrillation with RVR in context of acute illness  Appears to be new dx. Likely brought on by acute illness as above.   - IVF boluses and maintenance given. - Rate up to around 110s, now better.    - continue telemetry monitoring  - will monitor with ongoing tx as above, consider prn rate control if necessary and if BP can tolerate  - Echo to be considered - covid is pending     Hyponatremia - improved  Admission Na 132.   - repeat in AM after fluid boluses above  --> Na 139 this morning     Dementia  - oriented to self, date and knows she is in the hospital currently     HTN  - hold PTA atenolol due to hypotension  (prn BB as above due to af rvr if BP can tolerate)     Glaucoma  - continue PTA eye drops     HLD  - resume PTA statin once taking PO      Diet: NPO for Medical/Clinical Reasons Except for: No Exceptions    DVT Prophylaxis: Pneumatic Compression Devices  Canela Catheter: not present  Code Status: No CPR- Do NOT Intubate    Rule Out COVID-19 Handoff:  Not low suspicion, plan retest if negative    Disposition Plan   Expected discharge: 2+ days, pending clinical course  Entered: Tang Callahan MD 09/29/2020, 8:15 AM       The patient's care was discussed with the Bedside Nurse and Patient.    Tang Callahan MD  Hospitalist Service  [facility]    ______________________________________________________________________    Interval History   Patient seen and examined.  Chart reviewed.  Patient was on BiPAP overnight with  improving blood gases.  She is now refusing BiPAP and on oxygen mask not she requesting her baseline nasal cannula.  She was able to awaken and converse with me-she is oriented to year, herself, and her son's name.  Baseline dementia noted.  She denies any new pains or trouble breathing.  Blood pressure variable.  Unable to reach her son on the phone and I will try again later today.    Data reviewed today: I reviewed all medications, new labs and imaging results over the last 24 hours. I personally reviewed no images or EKG's today.    Physical Exam   Vital Signs: Temp: 97.8  F (36.6  C) Temp src: Axillary BP: (!) 88/28 Pulse: 130   Resp: 22 SpO2: 95 % O2 Device: Simple face mask Oxygen Delivery: 9 LPM  Weight: 115 lbs 1.28 oz    Gen: NAD, pleasant, frail and elderly  HEENT: Normocephalic, EOMI, MMM  Resp: no focal crackles,  no wheezes, no increased work of resp  CV: S1S2 heard, irreg irreg rhythm, reg rate, no pitting pedal edema  Abdo: soft, nontender, nondistended, bowel sounds present  Ext: calves nontender, well perfused  Neuro: arouses to voice, O to self, year, son's name, some dementia at baseline noted, CN grossly intact, no facial asymmetry      Data   Recent Labs   Lab 09/29/20  0707 09/29/20  0548 09/28/20  1156   WBC  --  6.2 6.3   HGB  --  9.5* 11.3*   MCV  --  100 100   PLT  --  163 224     --  132*   POTASSIUM 4.6  --  5.3   CHLORIDE 105  --  93*   CO2 28  --  39*   BUN 10  --  11   CR 0.67  --  0.67   ANIONGAP 6  --  <1*   BILLY 7.9*  --  9.1   *  --  74   ALBUMIN 2.2*  --  2.2*   PROTTOTAL 6.0*  --  7.2   BILITOTAL 0.3  --  0.4   ALKPHOS 95  --  135   ALT 11  --  14   AST 19  --  42   TROPI  --   --  <0.015     Recent Results (from the past 24 hour(s))   XR Chest Port 1 View    Narrative    CHEST ONE VIEW PORTABLE  9/28/2020 12:45 PM     HISTORY: Shortness of breath and history of COPD.    COMPARISON: 12/31/2012.      Impression    IMPRESSION: Interval development of minimal infiltrate  or increasing  atelectatic change in the right lung base adjacent to the  hemidiaphragm. The remainder of the lungs are clear. Previously  suggested nodular density in the right mid to lower lung zone is no  longer seen. Heart size is normal. Vascular calcifications.    ADRIANNA LI MD

## 2020-09-29 NOTE — PROVIDER NOTIFICATION
MD Notification    Notified Person: MD    Notified Person Name: Garcia, MD    Notification Date/Time: 9/29 0615    Notification Interaction: amcom page    Purpose of Notification: OBS 13-1 L.L.    FYI New onset afib RVR rate 110-120s.    *[id number] PLACIDO Longoria    Orders Received: Stat EKG, more labs, metoprolol    Comments:

## 2020-09-29 NOTE — PHARMACY-VANCOMYCIN DOSING SERVICE
Pharmacy Vancomycin Initial Note  Date of Service 2020  Patient's  1935  85 year old, female    Indication: sepsis, PNA    Current estimated CrCl = Estimated Creatinine Clearance: 51.5 mL/min (based on SCr of 0.67 mg/dL).    Creatinine for last 3 days  2020: 11:56 AM Creatinine 0.67 mg/dL    Recent Vancomycin Level(s) for last 3 days  No results found for requested labs within last 72 hours.      Vancomycin IV Administrations (past 72 hours)                   vancomycin (VANCOCIN) 750 mg in sodium chloride 0.9 % 250 mL intermittent infusion (mg) 750 mg New Bag 20 1540                Nephrotoxins and other renal medications (From now, onward)    Start     Dose/Rate Route Frequency Ordered Stop    20 0300  vancomycin (VANCOCIN) 1000 mg in dextrose 5% 200 mL PREMIX      1,000 mg  200 mL/hr over 1 Hours Intravenous EVERY 12 HOURS 20 2249      20 2300  piperacillin-tazobactam (ZOSYN) 4.5 g vial to attach to  mL bag      4.5 g  over 30 Minutes Intravenous EVERY 6 HOURS 20 2101            Contrast Orders - past 72 hours (72h ago, onward)    None                Plan:  1.  Vancomycin 750 mg dose x1 in ED. Start vancomycin 1000 mg IV q12h.   2.  Goal Trough Level: 15-20 mg/L   3.  Pharmacy will check trough levels as appropriate in 1-3 Days.    4. Serum creatinine levels will be ordered daily for the first week of therapy and at least twice weekly for subsequent weeks.    5. [place] method utilized to dose vancomycin therapy: Method 2    Barbie South RP

## 2020-09-29 NOTE — PROVIDER NOTIFICATION
MD Notification    Notified Person: MD    Notified Person Name: MD Barb    Notification Date/Time: 9/28 2254    Notification Interaction: amcom page    Purpose of Notification: OBS 13-1 L.L.    see VBG results.    *[id number] PLACIDO Longoria    Orders Received:    Comments:

## 2020-09-29 NOTE — PLAN OF CARE
Care Plan Nursing Note    Patient Information  Name: Jeb Palomino  Age: 85 year old  Reason for admission: SOB and Low BP    Patient Assessment   DATE & TIME: 09/29/20, 11-23:30  Cognitive Concerns/ Orientation :  Disoriented to time and situation  BEHAVIOR & AGGRESSION TOOL COLOR: green  ABNL VS/O2: vitals was stable except tachy at times, she was on BIPAP,now on oxy-mask 2 liters NC,  Low BP in the salma  MOBILITY: bed rest. Turn and reposition  PAIN MANAGMENT: no non-verbal pain indicator noted in the first 4 hrs of the shift, and denied any pain this salma  DIET: NPO when she was using BIPAP, but now dc'd.   BOWEL/BLADDER: no void , bladder scan was 96 ml  1330, and 119 ml 1600.and had one wet/incontinent brief afterward.   ABNL LAB/BG: Lactate 2.4. she had albumen infusion , plan was to repeat Lactate 1900. But then canceled d/t comfort care status  DRAIN/DEVICES: PIV , Oxy mask   TELEMETRY RHYTHM: A-fib with RVR,  Tele dc'd   SKIN: Bruises, abrasion, scabs  TESTS/PROCEDURES: Lab, plan to repeat COVID test mid day tomorrow. Spoke to her son about the repeat test if mom is still around and he states that he will like the test to be repeated.  D/C DAY/GOALS/PLACE: Pending  OTHER IMPORTANT INFO:   the first 4 hours of the shift,  patient open her eyes only for vigorous stimulation. Around 1538-8807. She was awake. Oriented times two (name, month/year), disoriented to time and situations. She was agitated trying to pull tubes and IV. Arranged 1-1 sit for her. About 1800, SBP drop in the 50 s. Her LOC decreased some, but still awake. RRT was called. IVF given bolus  per order, no improvement. Patient is DNR/DNI.  Her son was called & updated patient's current condition. The house officer (PA) also spoke with her son and per patient/family wishes comfort care is order. Assist of two to turn and reposition in bed. Son is at the bedside. Continue to monitor

## 2020-09-29 NOTE — PLAN OF CARE
COVID pending. Vital signs stable on BIPAP, occasionally hypotensive. A&Ox self, knows shes in a hospital. A2, T/R q2h. Tele SR c ST elevation. CMS intact, cool extremities. Lung sounds diminished, wheezes. NPO. Incontinent B/B. Adequate urinary output via purewick. Bowel sounds active. Denies pain. Small abrasion to nose, skin otherwise just bruised. R PIV inf NS at 125/hr, int abx. Pulling at lines and tubes - mittens in the room and IV haldol PRN given x1. q4h VBGs. Continue to monitor.

## 2020-09-29 NOTE — CODE/RAPID RESPONSE
[facility]    RRT Note  9/29/2020   Time Called: 609pm    RRT called for: Hypotension    Assessment & Plan   IMPRESSION & PLAN:    Jeb Palomino is a 85 year old female w/ PMH of O2 dependent COPD, hypertension, hyperlipidemia, dementia who was admitted on 9/28/2020 for worsening dyspnea and found to have acute on chronic respiratory failure hypercarbic type.  Since admission she has been continued on broad-spectrum antimicrobials, bilevel noninvasive positive pressure ventilation, IV steroids for possible COPD exacerbation.  Her course has been complicated by recurrent hypotension and A. fib with RVR which is new.     RRT was called this evening because of hypotension.  On review of her vital signs in the last 24 hours she has had numerous hypotensive events.  This afternoon she was 50/47 and for that reason rapid response was called.    On my arrival patient is awake she is able to answer questions and denies any chest pain.  She is able to follow commands.  Checking blood pressures on multiple different extremities continues to show hypotension.  She is DNR/DNI.      Impression: undifferentiated shock state  Differential diagnosis: Is broad and includes  -Cardiogenic shock including but not limited to right-sided and/or left-sided heart failure  -Septic shock  -Obstructive shock    INTERVENTIONS:  -IVF bolus 1L NS  -Patient's bedside RN was able to get son on the phone and I was able to speak with him, son Carlos.  I shared with him patient's current situation with A. fib with RVR as well as profound hypotension.  It is difficult to know if these are accurate blood pressures given that she is awake and moving everything and able to follow commands however they persist regardless of which extremity is checked.  There is no evidence of cutaneous hypoperfusion such as mottling.  Son did confirm that patient is DNR/DNI.  And trying to get a sense of her functional status and how things were going  "prehospital son reported that patient repeats the mantra of \"I cannot breathe,\" \"I cannot get enough air,\" and \"I'm short of breath.\"  When queried what is most important to mom if time is short he indicated \"comfort\".  I briefly discussed 2 different paths 1 including central access placements vasopressor support need to transfer to ICU.  We also discussed comfort measures only with opioids, anxiolytics to treat air hunger symptoms.  In short he did not feel that aggressive, invasive interventions were consistent with mom's overall wishes and that he favored a comfort directed approach.  He does desire to come in and see patient but is hopeful that comfort care can be started this evening even before he arrives.  We have informed him that he may visit given her comfort care status.  -comfort orders started    Working diagnosis: Undifferentiated shock, anticipate impending death in the next 24 hours    At the end of the RRT goals of treatment had been clarified.  BiPAP was removed and she was placed on oxygen mask.  Comfort measure orders have been initiated.    disposition: Patient may remain on this unit.    Discussed with bedside nurse    Code Status: No CPR- Do NOT Intubate, now comfort care    Allergies   Allergies   Allergen Reactions     No Known Drug Allergies        Physical Exam   Vital Signs with Ranges:  Temp:  [96.5  F (35.8  C)-98.1  F (36.7  C)] 98.1  F (36.7  C)  Pulse:  [] 120  Resp:  [11-30] 15  BP: ()/(21-89) (P) 50/47  FiO2 (%):  [40 %-60 %] 60 %  SpO2:  [92 %-100 %] 95 %  I/O last 3 completed shifts:  In: -   Out: 600 [Urine:600]    Constitutional: acute distress  Neuro: +follows commands wiggle toes bilat, face symmetric, tongue midline, THOMPSON  HEENT: NC/AT, pupils equal round 3mm briskly reactive bilat, sclera nonicteric, noninjected, conjunctiva pink dry oral mucosa  Neck: supple  Heart: A. fib with RVR  Lungs: On AVAPS 0.6 FiO2  Abd:defer  Ext: no edema, no mottling    Data "     ABG:  -  Recent Labs   Lab 09/29/20  1405   PH 7.24*   PCO2 67*   PO2 159*   HCO3 29*   O2PER 40%       Troponin:    Recent Labs   Lab Test 09/28/20  1156   TROPI <0.015       CBC with Diff:  Recent Labs   Lab Test 09/29/20  0548   WBC 6.2   HGB 9.5*              Lactic Acid:    Lab Results   Component Value Date    LACT 1.5 09/29/2020           Comprehensive Metabolic Panel:  Recent Labs   Lab 09/29/20  0707      POTASSIUM 4.6   CHLORIDE 105   CO2 28   ANIONGAP 6   *   BUN 10   CR 0.67   GFRESTIMATED 80   GFRESTBLACK >90   BILLY 7.9*   MAG 1.6   PHOS 2.5   PROTTOTAL 6.0*   ALBUMIN 2.2*   BILITOTAL 0.3   ALKPHOS 95   AST 19   ALT 11       Time Spent on this Encounter   I spent 35 minutes (610-645pm) of critical care time on the unit/floor managing the care of Jeb Palomino. Upon evaluation, this patient had a high probability of imminent or life-threatening deterioration due to undifferentiated shock, which required my direct attention, intervention, and personal management. 100% of my time was spent at the bedside counseling the patient and/or coordinating care regarding services listed in this note.    Maribell Marquez, [facility]  Hospitalist [place]  [phone]

## 2020-09-29 NOTE — PROVIDER NOTIFICATION
Brief update    Paged re: agitation, pulling at lines and tubes    Added IV haldol prn    Puma Garcia MD  1:47 AM

## 2020-09-30 NOTE — PROVIDER NOTIFICATION
"Paged Dr. Callahan: \"2nd covid came back negative- can precautions be removed?\"    Iso removed  "

## 2020-09-30 NOTE — CONSULTS
"Care Transition Initial Assessment - RN  Consulted for:  \" Care .  Schedule Home care visit within 24 - 48 hours post-discharge; Schedule f/u PCP appointment within 7 - 10 days post-discharge. \"    Initial assessment completed via chart review as MD notes, \"appears possible expiration in hospital.  Will pursue discussion with CT/SW and family regarding hospice discharge pending today's events/course.\"        DATA   Active Problems:    Acute respiratory failure (H)       Cognitive Status: \"Does not wake up with cares\" per nursing documentation.    Contact information and PCP information verified: Yes  + Dr. Jose Doyle [facility] in [facility]     Insurance concerns: No Insurance issues identified  + Active UCARE/UCARE MEDICARE NON [employer]     ASSESSMENT  Patient currently receives the following services:    + from assisted living (from which she was admitted this visit); previously lived with son and daughter in law  + home oxygen (2L) at baseline     Identified issues/concerns regarding health management:   + comfort cares in hospital at this time; CC-RN & CC-SW available if discharge/dispo planning needed, but at this time possibly will  in hospital.      PLAN  Financial costs for the patient include: per insurance .  Patient given options and choices for discharge: TBD .  Patient/family is agreeable to the plan?  TBD .  Patient anticipates discharging to: TBD .  Patient anticipates needs for home equipment: TBD .  Transportation/person available to transport on day of discharge  is: TBD .   Plan/Disposition: possibly will  in the hospital .   Appointments: N/A    Care  (CTS) will continue to follow as needed.    Sruthi Owen RN, BSN, PHN  [facility] Care Coordination  [facility]   Mobile: [phone]            "

## 2020-09-30 NOTE — PROGRESS NOTES
CROSS COVER:    Paged regarding removal of IMC status.    --Order removed.  Called charge nurse to ensure IMC status removal.      Rowdy Toth PA-C

## 2020-09-30 NOTE — PLAN OF CARE
Patient is comfort care. Does not wake up with cares. Given IV morphine x2 for apparent breathing discomfort, spO2 went from mid 80s to upper 90s after administration of morphine. Turn and repo Q2H. Oral cares provided as tolerated. Allowed visitors, son is LILLIAN. Plan to reswab for COVID-19 today. Continue to monitor.

## 2020-09-30 NOTE — PROGRESS NOTES
[facility]    Medicine Progress Note - Hospitalist Service       Date of Admission:  9/28/2020  Assessment & Plan       Jeb Palomino is a 85 year old female admitted on 9/28/2020.  Past history of oxygen dependent COPD, HTN, HLD, dementia who presents with shortness of breath, found to have acute on chronic respiratory failure with severe respiratory acidosis.      Comfort cares (PM of 9/29)  Despite efforts patient continued to deteriorate yesterday. RRT was called and family was again contacted and decided that her wishes would be to pursue comfort as main goal. Comfort meds/prns are in place.   - family ok with covid retest today (order placed for noon which is about 48 hrs after initial)    Acute on chronic hypoxic and hypercapnic respiratory failure  Oxygen dependent COPD (2L O2 chronically)  Presents with increased dyspnea, admission VBG with pH 7.09 and pCO2 of 112.  CXR with right basilar infiltrate vs atelectasis at right lung base.  Afebrile without leukocytosis though lactate mildly elevated at 2.5.  EKG and troponin were unremarkable, though Pro-BNP elevated at 5758.  She was placed on Bipap with slow improvement in pCO2 and marginal improvement in pH.    - continue vanco and zosyn  - check procal  - follow blood cultures  - symptomatic COVID swab pending; she denies any other symptoms currently  - would reassess prior to discontinuing precautions  --- felt to be NOT low suspicion, will plan on retest 48-72 hrs after initial test  - serial VBG and lactate - bld gas improved this morning after bipap overnight  - repeat NS bolus 1L, then  ml/hr  - albumin 12.5g x1  - continue solumedrol 60 mg IV q8h  - duonebs q4h  - Bipap overnight, can trial off but may need back on, will discuss with patient and family this morning  - IMC monitoring  - on adm severity of illness discussed with son Malcolm (who is POA), who confirmed DNR/DNI status and no pressors   - Comfort cares    Borderline  sepsis/SIRS  Hypotension  Admission SBP 70-80's.  LA elevated 2.6, improved with IVF. WBC normal. RR >20, HR >90 at times although this is in context of apparently new af with rvr previously in adm.   - treatment of possible sepsis as above - broad spectrum abx given, IVF bolus and maintenance, cultures pending     Atrial fibrillation with RVR in context of acute illness  Appears to be new dx. Likely brought on by acute illness as above.   - IVF boluses and maintenance given. - Rate up to around 110s, now better.    - continue telemetry monitoring  - will monitor with ongoing tx as above, consider prn rate control if necessary and if BP can tolerate  - Comfort cares    Hyponatremia - improved  Admission Na 132.   - repeat in AM after fluid boluses above  --> Na 139 9.29 AM  - Comfort cares - no more lab draws     Dementia  - Comfort cares     HTN  - hold PTA atenolol due to hypotension  (prn BB as above due to af rvr if BP can tolerate)  - Comfort cares     Glaucoma  - continue PTA eye drops     HLD  - resume PTA statin once taking PO      Diet: reg / comfort cares  DVT Prophylaxis: comfort cares  Canela Catheter: not present  Code Status: No CPR- Do NOT Intubate    Rule Out COVID-19 Handoff:  Not low, retesting today, if negative today can discontinue special precautions     Disposition Plan   Expected discharge: appears possible expiration in hospital.  Will pursue discussion with CT/SW and family regarding hospice discharge pending today's events/course.  Entered: Tang Callahan MD 09/30/2020, 7:53 AM       The patient's care was discussed with the Bedside Nurse and Patient. Will discuss again with family today.     Tang Callahan MD  Hospitalist Service  [facility]    ______________________________________________________________________    Interval History   Patient seen and examined this morning.  Chart reviewed-RRT last night and ultimately patient was made comfort cares.  Discussed with  nursing who has spoken with family who appears to be coming in today once they rearrange her schedule.  Continue comfort care medications.  Patient appears comfortable and is actually satting 100% on 1 to 2 L oxygen mask.  She continues to be nonresponsive.    Data reviewed today: I reviewed all medications, new labs and imaging results over the last 24 hours. I personally reviewed no images or EKG's today.    Physical Exam   Vital Signs: Temp: 98.1  F (36.7  C) Temp src: Axillary BP: (!) 75/41 Pulse: 85   Resp: 20 SpO2: 98 % O2 Device: Nasal cannula Oxygen Delivery: 2 LPM  Weight: 115 lbs 1.28 oz    Comfort care  Gen: NAD, appears comfortable  HEENT: Normocephalic  Resp: no audible wheeze, no apparent increased work of resp  CV: no audible murmur  Abdo: nondistended  Ext: nonedematous  Neuro: nonresponding to verbal cues, noxious stimuli obviously not attempted since patient is comfort cares.       Data   NNL  Comfort cares

## 2020-09-30 NOTE — PLAN OF CARE
Comfort cares continued. SOFIA orientation, non responsive. COVID 19 special precautions maintained. COVID retest sent to lab today-resulted negative. Iso precautions removed. T/R and oral cares q2h/prn. Shallow respirations. 2L O2 via oxymask maintained. Tachypnea, labored breathing, grimacing at times, PRN morphine in use. Son Carlos updated this evening. He is aware of negative covid test.

## 2020-10-01 NOTE — PROGRESS NOTES
[facility]    Hospitalist Progress Note    Interval History   - Unresponsive today, ?attached to a monitor will remove, breathing about RR 25, satting 96% on 2L  - Updated son Carlos, he is aware that hospice will be consulted to initiate discussions about discharge    Assessment & Plan   Summary: Jeb Palomino is a 85 year old female with PMH COPD on home O2, HTN, HLD, dementia who was admitted on 9/28/2020 with acute hypoxic and hypercarbic respiratory failure. Quickly developed undifferentiated shock and transitioned to comfort cares on 9/29/2020. Remains comfort cares on 10/1/2020.    Comfort cares  - Treat to comfort  - No vitals, no labs    Acute hypoxic and hypercarbic respiratory failure  Oxygen dependent COPD  Undifferentiated shock  Atrial fibrillation  Hyponatremia  Dementia  HTN  Glaucoma  HLD    DVT Prophylaxis: not needed  Code Status: No CPR- Do NOT Intubate  PT/OT: not needed  Diet:   Regular    Disposition: Expected discharge to hospice in 1-2 days    Aquilino Mayer MD  Text Page  (7am to 6pm)  -Data reviewed today: I reviewed all new labs and imaging results over the last 24 hours.    Physical Exam        Pulse: 87   Resp: 24 SpO2: 94 % O2 Device: Oxymask Oxygen Delivery: 2 LPM  Vitals:    09/28/20 2057   Weight: 52.2 kg (115 lb 1.3 oz)     Vital Signs with Ranges  Pulse:  [87-89] 87  Resp:  [16-24] 24  SpO2:  [92 %-97 %] 94 %  No intake/output data recorded.  O2 requirements: yes 2L    Constitutional: Female, unresponsive, appears ill  HEENT: Eyes closed  Lungs: RR 25, some accessory muscle use  Neuro/Psych: Unresponsive    Medications       dorzolamide  1 drop Both Eyes BID     fluticasone-vilanterol  1 puff Inhalation Daily     latanoprost  1 drop Both Eyes At Bedtime     sodium chloride (PF)  3 mL Intracatheter Q8H       Data   Recent Labs   Lab 09/29/20  0707 09/29/20  0548 09/28/20  1156   WBC  --  6.2 6.3   HGB  --  9.5* 11.3*   MCV  --  100 100   PLT  --  163  224     --  132*   POTASSIUM 4.6  --  5.3   CHLORIDE 105  --  93*   CO2 28  --  39*   BUN 10  --  11   CR 0.67  --  0.67   ANIONGAP 6  --  <1*   BILLY 7.9*  --  9.1   *  --  74   ALBUMIN 2.2*  --  2.2*   PROTTOTAL 6.0*  --  7.2   BILITOTAL 0.3  --  0.4   ALKPHOS 95  --  135   ALT 11  --  14   AST 19  --  42   TROPI  --   --  <0.015       Imaging:   No results found for this or any previous visit (from the past 24 hour(s)).

## 2020-10-01 NOTE — PROGRESS NOTES
SW: JULIETH informed that patient likely will need discharge plan as patient has not  in hospital as anticipated. SW left VM for son Carlos to call to further discuss families wishes for end-of-life goals. Return call pending.    SW to continue to follow and assist with discharge planning.    DANIEL Watkins  Daytime (8:00am-4:30pm): [phone]  After-Hours SW Pager (4:30pm-11:30pm): [phone]

## 2020-10-01 NOTE — PLAN OF CARE
Comfort care continued. Appears comfortable this shift. No non-verbal signs of pain. Breathing 22-24 unlabored. No morphine or ativan needed this shift. On 2L Oxymask for comfort. Oral care provided as tolerated. Turn and repo done q2hrs. Ilan Gonzalez called here and updated. Continue to monitor.

## 2020-10-01 NOTE — PLAN OF CARE
Comfort cares continued. Unresponsive. No signs/symptoms of pain. Pt appears to be resting comfortably. Breathing shallow, non labored. 2L O2 via oxymask.  T/R and oral cares q2h.  NPO. SW consulted for hospice facility discharge planning. Plan to transfer to station 88 this evening.     Report called to PLACIDO Najera. Ilan Gonzalez updated via phone. Belongings sent with patient.

## 2020-10-02 NOTE — PLAN OF CARE
SOFIA orientation, pt non responsive. Vitals and assessment deferred d/t comfort cares. No non-verbal signs of pain, gave PRN morphine for air hunger x1, on 2L oxymask for comfort. Abrasion to R forearm and R side of chin - Mepilexes in place. Blanchable redness to coccyx. T/R q2h. Incontinent, purewick in place w/ poor UOP. PIV SL. Breathing becoming more shallow, weak pulses, may pass soon. Discharge pending hospice placement. Continue to keep comfortable.

## 2020-10-02 NOTE — PROGRESS NOTES
[facility]    Hospitalist Progress Note    Interval History   - Appears duskier and with more agonal breathing today.   - Son at bedside, discussed, will continue hospice evaluation today, I will reassess patient once there are more concrete plans for transfer to hospice facility    Assessment & Plan   Summary: Jeb Palomino is a 85 year old female with PMH COPD on home O2, HTN, HLD, dementia who was admitted on 9/28/2020 with acute hypoxic and hypercarbic respiratory failure. Quickly developed undifferentiated shock and transitioned to comfort cares on 9/29/2020.    Comfort cares  - Treat to comfort  - No vitals, no labs    Acute hypoxic and hypercarbic respiratory failure  Oxygen dependent COPD  Undifferentiated shock  Atrial fibrillation  Hyponatremia  Dementia  HTN  Glaucoma  HLD    DVT Prophylaxis: not needed  Code Status: No CPR- Do NOT Intubate  PT/OT: not needed  Diet:   Regular    Disposition: Expected discharge to hospice when bed found    Aquilino Mayer MD  Text Page  (7am to 6pm)  -Data reviewed today: I reviewed all new labs and imaging results over the last 24 hours.    Physical Exam            Resp: 24 SpO2: 94 % O2 Device: Oxymask Oxygen Delivery: 2 LPM  Vitals:    09/28/20 2057   Weight: 52.2 kg (115 lb 1.3 oz)     Vital Signs with Ranges  Resp:  [24] 24  SpO2:  [94 %-97 %] 94 %  No intake/output data recorded.  O2 requirements: yes 2L    Constitutional: Female, unresponsive, appears ill  HEENT: Eyes closed  Lungs: RR 25, some accessory muscle use  Neuro/Psych: Unresponsive    Medications       dorzolamide  1 drop Both Eyes BID     fluticasone-vilanterol  1 puff Inhalation Daily     latanoprost  1 drop Both Eyes At Bedtime       Data   Recent Labs   Lab 09/29/20  0707 09/29/20  0548 09/28/20  1156   WBC  --  6.2 6.3   HGB  --  9.5* 11.3*   MCV  --  100 100   PLT  --  163 224     --  132*   POTASSIUM 4.6  --  5.3   CHLORIDE 105  --  93*   CO2 28  --  39*   BUN  10  --  11   CR 0.67  --  0.67   ANIONGAP 6  --  <1*   BILLY 7.9*  --  9.1   *  --  74   ALBUMIN 2.2*  --  2.2*   PROTTOTAL 6.0*  --  7.2   BILITOTAL 0.3  --  0.4   ALKPHOS 95  --  135   ALT 11  --  14   AST 19  --  42   TROPI  --   --  <0.015       Imaging:   No results found for this or any previous visit (from the past 24 hour(s)).

## 2020-10-02 NOTE — PLAN OF CARE
DATE & TIME: 10/01/2020; 1604-1413    Cognitive Concerns/ Orientation : disoriented, lethargic; nonverbal     BEHAVIOR & AGGRESSION TOOL COLOR: Green   CIWA SCORE: N/A   ABNL VS/O2: discontinued; comfort cares; O2 2 L oxymask    MOBILITY: total cares; turn and repo q2h  PAIN MANAGMENT: absence of nonverbal indicators of pain   DIET: NPO; oral cares   BOWEL/BLADDER: BS hypoactive x 4; incontinent of B & B; purewick in place   ABNL LAB/BG: no current labs   DRAIN/DEVICES: PIV/SL  TELEMETRY RHYTHM: N/A  SKIN: wound on chin and R. Forearm (mepilex in place); blanchable redness   TESTS/PROCEDURES: N/A   D/C DAY/GOALS/PLACE: pending discharge to hospice facility  OTHER IMPORTANT INFO: lung sounds diminished, shallow breathing; son at bedside/ updated on cares. Nursing will continue to monitor and assess.     MD/RN ROUNDING SIGNED OFF D/E SHIFT: N/A  COMMIT TO SIT DONE AND SIGNED OFF: COMPLETE

## 2020-10-02 NOTE — DISCHARGE SUMMARY
[facility]    Hospitalist Discharge Summary       Date of Admission:  2020  Date of Discharge:  10/2/2020  Discharging Provider: Aquilino Mayer MD      Discharge Diagnoses   Patient  this admission  Acute hypoxic and hypercarbic respiratory failure  Possible pneumonia vs acute COPD exacerbation  Undifferentiated shock, possible septic shock    Hospital Course   Summary: Jeb Palomino is a 85 year old female with PMH COPD on home O2, HTN, HLD, dementia who was admitted on 2020 with acute hypoxic and hypercarbic respiratory failure. Quickly developed undifferentiated shock and transitioned to comfort cares on 2020. Patient  on 10/2/2020.    Consultations This Hospital Stay   PHARMACY TO DOSE VANCO  CARE COORDINATOR IP CONSULT  RESPIRATORY CARE IP CONSULT  SOCIAL WORK IP CONSULT    Code Status   No CPR- Do NOT Intubate    Time Spent on this Encounter   I, Aquilino Mayer, personally saw the patient today and spent approximately 5 minutes discharging this patient.       Aquilino Mayer MD  [facility]  ______________________________________________________________________    Physical Exam   Vital Signs:          Resp: 24 SpO2: 94 % O2 Device: Oxymask Oxygen Delivery: 2 LPM  Weight: 115 lbs 1.28 oz    See house provider note       Primary Care Physician   Jose Doyle    Discharge Disposition   Patient  this admission

## 2020-10-02 NOTE — PROGRESS NOTES
Transfer    S- Transfer to 88 from obs.    B- ARF     A- Brief systems assessment: disoriented, lethargic; turn and repositioned for comfort; Nursing will continue to monitor and assess.     R- Transfer to 88 per physician orders. Continue to monitor pt and update physician as needed.      Code status: DNR / DNI  Skin: Scattered bruising   Fall Risk: Yes- Department fall risk interventions implemented.  Isolation: None  Patient belongings: sent with pt. Remain in room   Medication drips upon transfer: N/A  Bedside Report Letter Given and explained to pt: Yes  Visitor Designated? Yes- Carlos/son   If patient is a 72 hour hold/Commitment are belongings removed from room and locked up? JOSEPH Heath, RN on 10/1/2020 at 8:19 PM

## 2020-10-02 NOTE — DEATH PRONOUNCEMENT
MD DEATH PRONOUNCEMENT    Called to pronounce Jeb Palomino dead.    Physical Exam: Unresponsive to noxious stimuli, Spontaneous respirations absent, Breath sounds absent, Carotid pulse absent and Heart sounds absent    Patient was pronounced dead at 08:25 AM, 2020.    Preliminary Cause of Death: Acute hypoxic and hypercarbic respiratory failure    Active Problems:    Acute respiratory failure (H)       Infectious disease present?: NO    Communicable disease present? (examples: HIV, chicken pox, TB, Ebola, CJD) :  NO    Multi-drug resistant organism present? (example: MRSA): NO    Please consider an autopsy if any of the following exist:  NO Unexpected or unexplained death during or following any dental, medical, or surgical diagnostic treatment procedures.   NO Death of mother at or up to seven days after delivery.     NO All  and pediatric deaths.     NO Death where the cause is sufficiently obscure to delay completion of the death certificate.   NO Deaths in which autopsy would confirm a suspected illness/condition that would affect surviving family members or recipients of transplanted organs.     The following deaths must be reported to the 's Office:  NO A death that may be due entirely or in part to any factors other than natural disease (recent surgery, recent trauma, suspected abuse/neglect).   NO A death that may be an accident, suicide, or homicide.     NO Any sudden, unexpected death in which there is no prior history of significant heart disease or any other condition associated with sudden death.   NO A death under suspicious, unusual, or unexpected circumstances.    NO Any death which is apparently due to natural causes but in which the  does not have a personal physician familiar with the patient s medical history, social, or environmental situation or the circumstances of the terminal event.   NO Any death apparently due to Sudden Infant Death Syndrome.     NO  Deaths that occur during, in association with, or as consequences of a diagnostic, therapeutic, or anesthetic procedure.   NO Any death in which a fracture of a major bone has occurred within the past (6) six months.   NO A death of persons note seen by their physician within 120 days of demise.     NO Any death in which the  was an inmate of a public institution or was in the custody of Law Enforcement personnel.   NO  All unexpected deaths of children   NO Solid organ donors   NO Unidentified bodies   NO Deaths of persons whose bodies are to be cremated or otherwise disposed of so that the bodies will later be unavailable for examination;   NO Deaths unattended by a physician outside of a licensed healthcare facility or licensed residential hospice program   NO Deaths occurring within 24 hours of arrival to a health care facility if death is unexpected.    NO Deaths associated with the decedent s employment.   NO Deaths attributed to acts of terrorism.   NO Any death in which there is uncertainty as to whether it is a medical examiner s care should be discussed with the medical investigator.        Body disposition: Body released to the morgue.      MEENAKSHI Rangel [facility]  Hospitalist Service  House Officer  Pager: [phone] (7a - 6p)

## 2020-10-04 LAB
BACTERIA SPEC CULT: NO GROWTH
BACTERIA SPEC CULT: NO GROWTH
SPECIMEN SOURCE: NORMAL
SPECIMEN SOURCE: NORMAL

## 2020-10-14 DIAGNOSIS — J44.9 CHRONIC OBSTRUCTIVE PULMONARY DISEASE, UNSPECIFIED COPD TYPE (H): ICD-10-CM

## 2020-10-14 RX ORDER — TIOTROPIUM BROMIDE 18 UG/1
CAPSULE ORAL; RESPIRATORY (INHALATION)
Qty: 90 CAPSULE | Refills: 0 | OUTPATIENT
Start: 2020-10-14

## 2022-08-15 NOTE — TELEPHONE ENCOUNTER
Received refill request for Dorzolamide. Will refill but reminded patient to call for appointment as she is overdue.   
Family

## 2022-12-08 NOTE — PROGRESS NOTES
Current Eye Medications:  Dorzolamide both eyes twice a day, Latanoprost both eyes every evening.  Last drops: this morning, but doesn't recall the exact time.     Subjective:  Follow up Pseudoexfoliation Glaucoma.  Patient is here for a pressure check, OCT, and visual field.  No vision changes or concerns.     Objective:  See Ophthalmology Exam.       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  Glaucoma OCT and Andres Visual Field show worsening both eyes.      Plan:  Will proceed with selective laser trabeculoplasty left eye first; probably right eye thereafter.    Continue using Dorzolamide both eyes twice daily (about 12 hours apart),  And Latanoprost both eyes at bedtime.   Wait at least 5 minutes between drops if using more than one at a time.   Return visit for [facility] at [facility] as scheduled - Wednesday, November 28th at 1:30pm    Clay Amos M.D.  [phone]                    2.5